# Patient Record
Sex: FEMALE | Race: WHITE
[De-identification: names, ages, dates, MRNs, and addresses within clinical notes are randomized per-mention and may not be internally consistent; named-entity substitution may affect disease eponyms.]

---

## 2019-10-28 ENCOUNTER — HOSPITAL ENCOUNTER (OUTPATIENT)
Age: 69
End: 2019-10-28
Payer: MEDICARE

## 2019-10-28 DIAGNOSIS — Z12.4: Primary | ICD-10-CM

## 2019-10-28 PROCEDURE — 88175 CYTOPATH C/V AUTO FLUID REDO: CPT

## 2019-10-28 PROCEDURE — G0145 SCR C/V CYTO,THINLAYER,RESCR: HCPCS

## 2023-11-20 ENCOUNTER — CLINICAL SUPPORT (OUTPATIENT)
Dept: PRIMARY CARE | Facility: CLINIC | Age: 73
End: 2023-11-20
Payer: MEDICARE

## 2023-11-20 DIAGNOSIS — Z23 NEED FOR IMMUNIZATION AGAINST INFLUENZA: ICD-10-CM

## 2023-11-20 PROCEDURE — 90662 IIV NO PRSV INCREASED AG IM: CPT | Performed by: FAMILY MEDICINE

## 2023-11-20 PROCEDURE — G0008 ADMIN INFLUENZA VIRUS VAC: HCPCS | Performed by: FAMILY MEDICINE

## 2023-11-21 ENCOUNTER — APPOINTMENT (OUTPATIENT)
Dept: PRIMARY CARE | Facility: CLINIC | Age: 73
End: 2023-11-21
Payer: MEDICARE

## 2023-11-21 DIAGNOSIS — Z00.00 HEALTHCARE MAINTENANCE: ICD-10-CM

## 2023-11-21 RX ORDER — HYDROCHLOROTHIAZIDE 25 MG/1
1 TABLET ORAL DAILY
COMMUNITY
Start: 2019-09-08 | End: 2023-11-21 | Stop reason: SDUPTHER

## 2023-11-21 RX ORDER — HYDROCHLOROTHIAZIDE 25 MG/1
25 TABLET ORAL DAILY
Qty: 30 TABLET | Refills: 1 | Status: SHIPPED | OUTPATIENT
Start: 2023-11-21 | End: 2023-11-28 | Stop reason: SDUPTHER

## 2023-11-28 DIAGNOSIS — Z00.00 HEALTHCARE MAINTENANCE: ICD-10-CM

## 2023-11-28 RX ORDER — HYDROCHLOROTHIAZIDE 25 MG/1
25 TABLET ORAL DAILY
Qty: 90 TABLET | Refills: 0 | Status: SHIPPED | OUTPATIENT
Start: 2023-11-28 | End: 2024-01-09 | Stop reason: SDUPTHER

## 2024-01-09 ENCOUNTER — OFFICE VISIT (OUTPATIENT)
Dept: PRIMARY CARE | Facility: CLINIC | Age: 74
End: 2024-01-09
Payer: MEDICARE

## 2024-01-09 VITALS
SYSTOLIC BLOOD PRESSURE: 142 MMHG | BODY MASS INDEX: 48.21 KG/M2 | DIASTOLIC BLOOD PRESSURE: 78 MMHG | HEART RATE: 64 BPM | HEIGHT: 62 IN | WEIGHT: 262 LBS

## 2024-01-09 DIAGNOSIS — R53.83 OTHER FATIGUE: ICD-10-CM

## 2024-01-09 DIAGNOSIS — R40.0 DAYTIME SOMNOLENCE: ICD-10-CM

## 2024-01-09 DIAGNOSIS — R73.09 ELEVATED GLUCOSE: ICD-10-CM

## 2024-01-09 DIAGNOSIS — Z53.20 OSTEOPOROSIS SCREENING DECLINED: ICD-10-CM

## 2024-01-09 DIAGNOSIS — Z12.31 ENCOUNTER FOR SCREENING MAMMOGRAM FOR BREAST CANCER: ICD-10-CM

## 2024-01-09 DIAGNOSIS — Z00.00 HEALTHCARE MAINTENANCE: ICD-10-CM

## 2024-01-09 DIAGNOSIS — Z23 NEED FOR PNEUMOCOCCAL VACCINATION: ICD-10-CM

## 2024-01-09 DIAGNOSIS — E66.01 MORBID (SEVERE) OBESITY DUE TO EXCESS CALORIES (MULTI): ICD-10-CM

## 2024-01-09 DIAGNOSIS — Z00.00 ROUTINE GENERAL MEDICAL EXAMINATION AT HEALTH CARE FACILITY: Primary | ICD-10-CM

## 2024-01-09 PROBLEM — Z96.659 HISTORY OF KNEE REPLACEMENT: Status: ACTIVE | Noted: 2024-01-09

## 2024-01-09 PROBLEM — E78.5 HYPERLIPIDEMIA: Status: ACTIVE | Noted: 2024-01-09

## 2024-01-09 PROBLEM — J30.2 SEASONAL ALLERGIES: Status: ACTIVE | Noted: 2024-01-09

## 2024-01-09 PROBLEM — I10 HYPERTENSION: Status: ACTIVE | Noted: 2024-01-09

## 2024-01-09 PROBLEM — K21.9 GERD (GASTROESOPHAGEAL REFLUX DISEASE): Status: ACTIVE | Noted: 2024-01-09

## 2024-01-09 PROCEDURE — 1160F RVW MEDS BY RX/DR IN RCRD: CPT | Performed by: FAMILY MEDICINE

## 2024-01-09 PROCEDURE — 1124F ACP DISCUSS-NO DSCNMKR DOCD: CPT | Performed by: FAMILY MEDICINE

## 2024-01-09 PROCEDURE — 99214 OFFICE O/P EST MOD 30 MIN: CPT | Performed by: FAMILY MEDICINE

## 2024-01-09 PROCEDURE — G0439 PPPS, SUBSEQ VISIT: HCPCS | Performed by: FAMILY MEDICINE

## 2024-01-09 PROCEDURE — G0009 ADMIN PNEUMOCOCCAL VACCINE: HCPCS | Performed by: FAMILY MEDICINE

## 2024-01-09 PROCEDURE — 90677 PCV20 VACCINE IM: CPT | Performed by: FAMILY MEDICINE

## 2024-01-09 PROCEDURE — 3008F BODY MASS INDEX DOCD: CPT | Performed by: FAMILY MEDICINE

## 2024-01-09 PROCEDURE — 1170F FXNL STATUS ASSESSED: CPT | Performed by: FAMILY MEDICINE

## 2024-01-09 PROCEDURE — 1159F MED LIST DOCD IN RCRD: CPT | Performed by: FAMILY MEDICINE

## 2024-01-09 PROCEDURE — 3077F SYST BP >= 140 MM HG: CPT | Performed by: FAMILY MEDICINE

## 2024-01-09 PROCEDURE — 1036F TOBACCO NON-USER: CPT | Performed by: FAMILY MEDICINE

## 2024-01-09 PROCEDURE — 3078F DIAST BP <80 MM HG: CPT | Performed by: FAMILY MEDICINE

## 2024-01-09 RX ORDER — IBUPROFEN 200 MG
TABLET ORAL
COMMUNITY

## 2024-01-09 RX ORDER — HYDROCHLOROTHIAZIDE 25 MG/1
25 TABLET ORAL DAILY
Qty: 90 TABLET | Refills: 3 | Status: SHIPPED | OUTPATIENT
Start: 2024-01-09

## 2024-01-09 RX ORDER — ROSUVASTATIN CALCIUM 5 MG/1
5 TABLET, COATED ORAL DAILY
Qty: 90 TABLET | Refills: 3 | Status: SHIPPED | OUTPATIENT
Start: 2024-01-09

## 2024-01-09 RX ORDER — LEVALBUTEROL TARTRATE 45 UG/1
1 AEROSOL, METERED ORAL EVERY 4 HOURS PRN
COMMUNITY
End: 2024-01-09 | Stop reason: SDUPTHER

## 2024-01-09 RX ORDER — LEVALBUTEROL TARTRATE 45 UG/1
1-2 AEROSOL, METERED ORAL EVERY 4 HOURS PRN
Qty: 15 G | Refills: 11 | Status: SHIPPED | OUTPATIENT
Start: 2024-01-09

## 2024-01-09 RX ORDER — MULTIVITAMIN
TABLET ORAL
COMMUNITY

## 2024-01-09 RX ORDER — OMEPRAZOLE 40 MG/1
40 CAPSULE, DELAYED RELEASE ORAL DAILY
Qty: 90 CAPSULE | Refills: 3 | Status: SHIPPED | OUTPATIENT
Start: 2024-01-09

## 2024-01-09 RX ORDER — CETIRIZINE HYDROCHLORIDE 10 MG/1
TABLET ORAL
COMMUNITY

## 2024-01-09 RX ORDER — PNV NO.95/FERROUS FUM/FOLIC AC 28MG-0.8MG
1 TABLET ORAL DAILY
COMMUNITY

## 2024-01-09 RX ORDER — OMEPRAZOLE 40 MG/1
1 CAPSULE, DELAYED RELEASE ORAL DAILY
COMMUNITY
End: 2024-01-09 | Stop reason: SDUPTHER

## 2024-01-09 RX ORDER — ROSUVASTATIN CALCIUM 5 MG/1
1 TABLET, COATED ORAL DAILY
COMMUNITY
Start: 2019-09-27 | End: 2024-01-09 | Stop reason: SDUPTHER

## 2024-01-09 RX ORDER — MULTIVITAMIN
1 TABLET ORAL
COMMUNITY
Start: 2005-11-21

## 2024-01-09 ASSESSMENT — PATIENT HEALTH QUESTIONNAIRE - PHQ9
SUM OF ALL RESPONSES TO PHQ9 QUESTIONS 1 AND 2: 0
1. LITTLE INTEREST OR PLEASURE IN DOING THINGS: NOT AT ALL
2. FEELING DOWN, DEPRESSED OR HOPELESS: NOT AT ALL

## 2024-01-09 ASSESSMENT — ACTIVITIES OF DAILY LIVING (ADL)
DRESSING: INDEPENDENT
TAKING_MEDICATION: INDEPENDENT
GROCERY_SHOPPING: INDEPENDENT
BATHING: INDEPENDENT
MANAGING_FINANCES: INDEPENDENT
DOING_HOUSEWORK: INDEPENDENT

## 2024-01-09 NOTE — PROGRESS NOTES
PRIOR AUTH DONE. ORDER EMAILED TO [facility] AT [facility] TO SCHEDULE WITH PATIENT.

## 2024-01-09 NOTE — PATIENT INSTRUCTIONS
Follow up 1 mos, labs and sleep study prior, call concerns.  Add flonase to zyrtec to help with drainage and cough.

## 2024-01-09 NOTE — PROGRESS NOTES
"Subjective   Reason for Visit: Sheila Welsh is an 73 y.o. female here for a Medicare Wellness visit.     Past Medical, Surgical, and Family History reviewed and updated in chart.    Reviewed all medications by prescribing practitioner or clinical pharmacist (such as prescriptions, OTCs, herbal therapies and supplements) and documented in the medical record.  Had Covid this September.   Has had a cough for awhile.  Started using her rescue inhaler and it helped.  Has history of allergies, and prone to wheezing.  Has to clear throat frequently.  ON zyrtec, recommend add flonase. Close follow up  Has been very tired.  Gained weight.  Plans to go back to [employer].   Pain in feet.  Worse at end of day.    GERD on meds  HTN, borderline reading, will monitor  RAD, stable currently  HPI    Patient Care Team:  Melanie Marti MD as PCP - General (Family Medicine)  Melanie Marti MD as PCP - [employer]age PCP     Review of Systems   All other systems reviewed and are negative.      Objective   Vitals:  /78 (BP Location: Left arm, Patient Position: Sitting)   Pulse 64   Ht 1.575 m (5' 2\")   Wt 119 kg (262 lb)   BMI 47.92 kg/m²       Physical Exam  Vitals and nursing note reviewed.   Constitutional:       General: She is not in acute distress.     Appearance: She is obese. She is not toxic-appearing.   HENT:      Head: Normocephalic and atraumatic.   Cardiovascular:      Rate and Rhythm: Normal rate and regular rhythm.      Heart sounds: No murmur heard.  Pulmonary:      Effort: Pulmonary effort is normal.      Breath sounds: Normal breath sounds.   Musculoskeletal:      Cervical back: Neck supple. No rigidity.      Comments:     Skin:     General: Skin is warm and dry.   Neurological:      General: No focal deficit present.      Mental Status: She is alert and oriented to person, place, and time.   Psychiatric:         Mood and Affect: Mood normal.         Behavior: Behavior normal. "         Assessment/Plan   Problem List Items Addressed This Visit       Morbid (severe) obesity due to excess calories (CMS/HCC)    Relevant Orders    Lipid Panel     Other Visit Diagnoses       Routine general medical examination at health care facility    -  Primary    Body mass index (BMI) 45.0-49.9, adult (CMS/HCC)        Relevant Orders    Lipid Panel    Hemoglobin A1C    Other fatigue        Relevant Orders    CBC and Auto Differential    Comprehensive Metabolic Panel    Lipid Panel    TSH with reflex to Free T4 if abnormal    Vitamin B12    Elevated glucose        Relevant Orders    Hemoglobin A1C    Daytime somnolence        Relevant Orders    In-Center Sleep Study    Need for pneumococcal vaccination        Relevant Orders    Pneumococcal conjugate vaccine, 20-valent (PREVNAR 20) (Completed)    Encounter for screening mammogram for breast cancer        Relevant Orders    BI mammo bilateral screening tomosynthesis    Osteoporosis screening declined

## 2024-01-27 ENCOUNTER — TELEPHONE (OUTPATIENT)
Dept: SLEEP MEDICINE | Facility: CLINIC | Age: 74
End: 2024-01-27
Payer: MEDICARE

## 2024-01-31 ENCOUNTER — CLINICAL SUPPORT (OUTPATIENT)
Dept: SLEEP MEDICINE | Facility: CLINIC | Age: 74
End: 2024-01-31
Payer: MEDICARE

## 2024-01-31 VITALS
HEIGHT: 62 IN | SYSTOLIC BLOOD PRESSURE: 162 MMHG | DIASTOLIC BLOOD PRESSURE: 78 MMHG | BODY MASS INDEX: 48.28 KG/M2 | WEIGHT: 262.35 LBS | TEMPERATURE: 97.2 F

## 2024-01-31 DIAGNOSIS — G47.33 OBSTRUCTIVE SLEEP APNEA (ADULT) (PEDIATRIC): ICD-10-CM

## 2024-01-31 DIAGNOSIS — G47.61 PERIODIC LIMB MOVEMENT DISORDER: ICD-10-CM

## 2024-01-31 DIAGNOSIS — R40.0 DAYTIME SOMNOLENCE: ICD-10-CM

## 2024-01-31 PROCEDURE — 95810 POLYSOM 6/> YRS 4/> PARAM: CPT | Performed by: INTERNAL MEDICINE

## 2024-01-31 ASSESSMENT — SLEEP AND FATIGUE QUESTIONNAIRES
ESS-CHAD TOTAL SCORE: 3
HOW LIKELY ARE YOU TO NOD OFF OR FALL ASLEEP IN A CAR, WHILE STOPPED FOR A FEW MINUTES IN TRAFFIC: WOULD NEVER DOZE
HOW LIKELY ARE YOU TO NOD OFF OR FALL ASLEEP WHILE WATCHING TV: SLIGHT CHANCE OF DOZING
HOW LIKELY ARE YOU TO NOD OFF OR FALL ASLEEP WHILE SITTING AND TALKING TO SOMEONE: WOULD NEVER DOZE
HOW LIKELY ARE YOU TO NOD OFF OR FALL ASLEEP WHILE SITTING AND READING: SLIGHT CHANCE OF DOZING
HOW LIKELY ARE YOU TO NOD OFF OR FALL ASLEEP WHILE SITTING QUIETLY AFTER LUNCH WITHOUT ALCOHOL: WOULD NEVER DOZE
HOW LIKELY ARE YOU TO NOD OFF OR FALL ASLEEP WHEN YOU ARE A PASSENGER IN A CAR FOR AN HOUR WITHOUT A BREAK: WOULD NEVER DOZE
HOW LIKELY ARE YOU TO NOD OFF OR FALL ASLEEP WHILE LYING DOWN TO REST IN THE AFTERNOON WHEN CIRCUMSTANCES PERMIT: SLIGHT CHANCE OF DOZING
SITING INACTIVE IN A PUBLIC PLACE LIKE A CLASS ROOM OR A MOVIE THEATER: WOULD NEVER DOZE

## 2024-02-01 NOTE — PROGRESS NOTES
[facility] TECH NOTE:     Patient: Sheila Welsh   MRN//AGE: [id number]  1950  73 y.o.   Technologist: SAHRA Barcenas   Room: 4   Service Date: 2024        Sleep Testing Location: [place] [address]     [place]: 3    TECHNOLOGIST SLEEP STUDY PROCEDURE NOTE:   This sleep study is being conducted according to the policies and procedures outlined by the AAS accreditation standards.  The sleep study procedure and processes involved during this appointment was explained to the patient and all questions were answered. The patient verbalized understanding.      The patient is a 73 y.o. year old female scheduled for a Diagnostic PSG Split night with montage of:  PSG MASTER/  PAP MASTER       The study that was ultimately completed was a Polysomnogram  with montage of:  PSG MASTER  .    The full study Was completed.  Patient questionnaires completed?: yes     Consents signed? yes    Initial Fall Risk Screening:     Sheila has not fallen in the last 6 months. Sheila does not have a fear of falling. He does not need assistance with sitting, standing, or walking. she does not need assistance walking in her home. she does not need assistance in an unfamiliar setting. The patient is notusing an assistive device.     Brief Study observations: Mrs. Welsh arrived as scheduled for her SPLIT study. She was escorted to room 4. All questions were answered and consent was signed. The criteria for a split night study was not met due to poor sleep efficiency/frequent, prolonged awakenings. During sleep periods, she experienced intermittent moderate snoring, periodic limb movements and obstructive respiratory events. All stages of sleep were observed.     Deviation to order/protocol and reason: The criteria for split study was not met in time to complete a titration.        After the procedure, the patient was informed to ensure followup with ordering clinician for testing results.      Technologist: Zulma Tatum  RPSGT

## 2024-02-02 ENCOUNTER — LAB (OUTPATIENT)
Dept: LAB | Facility: LAB | Age: 74
End: 2024-02-02
Payer: MEDICARE

## 2024-02-02 DIAGNOSIS — R53.83 OTHER FATIGUE: ICD-10-CM

## 2024-02-02 DIAGNOSIS — R73.09 ELEVATED GLUCOSE: ICD-10-CM

## 2024-02-02 DIAGNOSIS — E66.01 MORBID (SEVERE) OBESITY DUE TO EXCESS CALORIES (MULTI): ICD-10-CM

## 2024-02-02 LAB
ALBUMIN SERPL BCP-MCNC: 4 G/DL (ref 3.4–5)
ALP SERPL-CCNC: 56 U/L (ref 33–136)
ALT SERPL W P-5'-P-CCNC: 14 U/L (ref 7–45)
ANION GAP SERPL CALC-SCNC: 8 MMOL/L (ref 10–20)
AST SERPL W P-5'-P-CCNC: 15 U/L (ref 9–39)
BASOPHILS # BLD AUTO: 0.04 X10*3/UL (ref 0–0.1)
BASOPHILS NFR BLD AUTO: 0.8 %
BILIRUB SERPL-MCNC: 0.5 MG/DL (ref 0–1.2)
BUN SERPL-MCNC: 27 MG/DL (ref 6–23)
CALCIUM SERPL-MCNC: 9.7 MG/DL (ref 8.6–10.3)
CHLORIDE SERPL-SCNC: 104 MMOL/L (ref 98–107)
CHOLEST SERPL-MCNC: 128 MG/DL (ref 0–199)
CHOLESTEROL/HDL RATIO: 2.4
CO2 SERPL-SCNC: 31 MMOL/L (ref 21–32)
CREAT SERPL-MCNC: 0.78 MG/DL (ref 0.5–1.05)
EGFRCR SERPLBLD CKD-EPI 2021: 81 ML/MIN/1.73M*2
EOSINOPHIL # BLD AUTO: 0.31 X10*3/UL (ref 0–0.4)
EOSINOPHIL NFR BLD AUTO: 5.9 %
ERYTHROCYTE [DISTWIDTH] IN BLOOD BY AUTOMATED COUNT: 14.4 % (ref 11.5–14.5)
EST. AVERAGE GLUCOSE BLD GHB EST-MCNC: 117 MG/DL
GLUCOSE SERPL-MCNC: 93 MG/DL (ref 74–99)
HBA1C MFR BLD: 5.7 %
HCT VFR BLD AUTO: 42.1 % (ref 36–46)
HDLC SERPL-MCNC: 54 MG/DL
HGB BLD-MCNC: 13.1 G/DL (ref 12–16)
IMM GRANULOCYTES # BLD AUTO: 0 X10*3/UL (ref 0–0.5)
IMM GRANULOCYTES NFR BLD AUTO: 0 % (ref 0–0.9)
LDLC SERPL CALC-MCNC: 61 MG/DL
LYMPHOCYTES # BLD AUTO: 1.35 X10*3/UL (ref 0.8–3)
LYMPHOCYTES NFR BLD AUTO: 25.8 %
MCH RBC QN AUTO: 29 PG (ref 26–34)
MCHC RBC AUTO-ENTMCNC: 31.1 G/DL (ref 32–36)
MCV RBC AUTO: 93 FL (ref 80–100)
MONOCYTES # BLD AUTO: 0.4 X10*3/UL (ref 0.05–0.8)
MONOCYTES NFR BLD AUTO: 7.6 %
NEUTROPHILS # BLD AUTO: 3.13 X10*3/UL (ref 1.6–5.5)
NEUTROPHILS NFR BLD AUTO: 59.9 %
NON HDL CHOLESTEROL: 74 MG/DL (ref 0–149)
NRBC BLD-RTO: 0 /100 WBCS (ref 0–0)
PLATELET # BLD AUTO: 269 X10*3/UL (ref 150–450)
POTASSIUM SERPL-SCNC: 4.1 MMOL/L (ref 3.5–5.3)
PROT SERPL-MCNC: 6.5 G/DL (ref 6.4–8.2)
RBC # BLD AUTO: 4.52 X10*6/UL (ref 4–5.2)
SODIUM SERPL-SCNC: 139 MMOL/L (ref 136–145)
TRIGL SERPL-MCNC: 65 MG/DL (ref 0–149)
TSH SERPL-ACNC: 1.35 MIU/L (ref 0.44–3.98)
VIT B12 SERPL-MCNC: 346 PG/ML (ref 211–911)
VLDL: 13 MG/DL (ref 0–40)
WBC # BLD AUTO: 5.2 X10*3/UL (ref 4.4–11.3)

## 2024-02-02 PROCEDURE — 80061 LIPID PANEL: CPT

## 2024-02-02 PROCEDURE — 83036 HEMOGLOBIN GLYCOSYLATED A1C: CPT

## 2024-02-02 PROCEDURE — 85025 COMPLETE CBC W/AUTO DIFF WBC: CPT

## 2024-02-02 PROCEDURE — 80053 COMPREHEN METABOLIC PANEL: CPT

## 2024-02-02 PROCEDURE — 82607 VITAMIN B-12: CPT

## 2024-02-02 PROCEDURE — 36415 COLL VENOUS BLD VENIPUNCTURE: CPT

## 2024-02-02 PROCEDURE — 84443 ASSAY THYROID STIM HORMONE: CPT

## 2024-02-05 ENCOUNTER — HOSPITAL ENCOUNTER (OUTPATIENT)
Dept: RADIOLOGY | Facility: CLINIC | Age: 74
Discharge: HOME | End: 2024-02-05
Payer: MEDICARE

## 2024-02-05 DIAGNOSIS — Z12.31 ENCOUNTER FOR SCREENING MAMMOGRAM FOR BREAST CANCER: ICD-10-CM

## 2024-02-05 PROCEDURE — 77063 BREAST TOMOSYNTHESIS BI: CPT | Performed by: RADIOLOGY

## 2024-02-05 PROCEDURE — 77067 SCR MAMMO BI INCL CAD: CPT

## 2024-02-05 PROCEDURE — 77067 SCR MAMMO BI INCL CAD: CPT | Performed by: RADIOLOGY

## 2024-02-08 ENCOUNTER — OFFICE VISIT (OUTPATIENT)
Dept: PRIMARY CARE | Facility: CLINIC | Age: 74
End: 2024-02-08
Payer: MEDICARE

## 2024-02-08 VITALS
WEIGHT: 260 LBS | HEART RATE: 68 BPM | SYSTOLIC BLOOD PRESSURE: 134 MMHG | DIASTOLIC BLOOD PRESSURE: 78 MMHG | BODY MASS INDEX: 47.84 KG/M2 | HEIGHT: 62 IN

## 2024-02-08 DIAGNOSIS — R53.83 OTHER FATIGUE: ICD-10-CM

## 2024-02-08 DIAGNOSIS — R73.03 PREDIABETES: Primary | ICD-10-CM

## 2024-02-08 PROCEDURE — 3008F BODY MASS INDEX DOCD: CPT | Performed by: FAMILY MEDICINE

## 2024-02-08 PROCEDURE — 3075F SYST BP GE 130 - 139MM HG: CPT | Performed by: FAMILY MEDICINE

## 2024-02-08 PROCEDURE — 99213 OFFICE O/P EST LOW 20 MIN: CPT | Performed by: FAMILY MEDICINE

## 2024-02-08 PROCEDURE — 1160F RVW MEDS BY RX/DR IN RCRD: CPT | Performed by: FAMILY MEDICINE

## 2024-02-08 PROCEDURE — 3078F DIAST BP <80 MM HG: CPT | Performed by: FAMILY MEDICINE

## 2024-02-08 PROCEDURE — 1036F TOBACCO NON-USER: CPT | Performed by: FAMILY MEDICINE

## 2024-02-08 PROCEDURE — 1159F MED LIST DOCD IN RCRD: CPT | Performed by: FAMILY MEDICINE

## 2024-02-08 NOTE — PROGRESS NOTES
Subjective  Sheila Welsh is a 73 y.o. female who presents for Follow-up.  HPI  Fup testing  A1c 5.7, discussed prediabetes and natural progression to diabetes if no changes, discussed healthy diet, no snacking after dinner  Sleep study results pending    Review of Systems   All other systems reviewed and are negative.  .    Objective     Visit Vitals  /78 (BP Location: Left arm, Patient Position: Sitting)   Pulse 68      Physical Exam  Vitals and nursing note reviewed.   Constitutional:       General: She is not in acute distress.     Appearance: Normal appearance. She is not toxic-appearing.   HENT:      Head: Normocephalic and atraumatic.   Cardiovascular:      Rate and Rhythm: Normal rate and regular rhythm.      Heart sounds: No murmur heard.  Pulmonary:      Effort: Pulmonary effort is normal.      Breath sounds: Normal breath sounds.   Musculoskeletal:      Cervical back: Neck supple. No rigidity.      Comments:     Skin:     General: Skin is warm and dry.   Neurological:      General: No focal deficit present.      Mental Status: She is alert and oriented to person, place, and time.   Psychiatric:         Mood and Affect: Mood normal.         Behavior: Behavior normal.         Assessment/Plan   Problem List Items Addressed This Visit       Prediabetes - Primary    Relevant Orders    Comprehensive Metabolic Panel    Hemoglobin A1C     Other Visit Diagnoses       Other fatigue                       Melanie Marti MD

## 2024-02-13 PROBLEM — G47.30 SEVERE SLEEP APNEA: Status: ACTIVE | Noted: 2024-02-13

## 2024-02-19 ENCOUNTER — OFFICE VISIT (OUTPATIENT)
Dept: PRIMARY CARE | Facility: CLINIC | Age: 74
End: 2024-02-19
Payer: MEDICARE

## 2024-02-19 VITALS
DIASTOLIC BLOOD PRESSURE: 74 MMHG | WEIGHT: 260 LBS | BODY MASS INDEX: 47.84 KG/M2 | HEART RATE: 68 BPM | SYSTOLIC BLOOD PRESSURE: 134 MMHG | HEIGHT: 62 IN

## 2024-02-19 DIAGNOSIS — G47.30 SEVERE SLEEP APNEA: Primary | ICD-10-CM

## 2024-02-19 PROCEDURE — 3078F DIAST BP <80 MM HG: CPT | Performed by: FAMILY MEDICINE

## 2024-02-19 PROCEDURE — 99213 OFFICE O/P EST LOW 20 MIN: CPT | Performed by: FAMILY MEDICINE

## 2024-02-19 PROCEDURE — 3008F BODY MASS INDEX DOCD: CPT | Performed by: FAMILY MEDICINE

## 2024-02-19 PROCEDURE — 1159F MED LIST DOCD IN RCRD: CPT | Performed by: FAMILY MEDICINE

## 2024-02-19 PROCEDURE — 1160F RVW MEDS BY RX/DR IN RCRD: CPT | Performed by: FAMILY MEDICINE

## 2024-02-19 PROCEDURE — 3075F SYST BP GE 130 - 139MM HG: CPT | Performed by: FAMILY MEDICINE

## 2024-02-19 PROCEDURE — 1036F TOBACCO NON-USER: CPT | Performed by: FAMILY MEDICINE

## 2024-02-19 NOTE — PROGRESS NOTES
Subjective  Sheila Welsh is a 73 y.o. female who presents for Follow-up.  HPI  Here for follow up sleep study due to fatigue.  Has severe sleep apnea, with o2 judith 78.  Recommend working toward ideal body weight.  Recommend cpap auto-titration. Discussed risks of untreated sleep apnea, and benefits of treatment.      Review of Systems   All other systems reviewed and are negative.  .    Objective     Visit Vitals  /74 (BP Location: Right arm, Patient Position: Sitting)   Pulse 68      Physical Exam  Vitals reviewed.   HENT:      Head: Normocephalic.   Pulmonary:      Effort: Pulmonary effort is normal.   Neurological:      General: No focal deficit present.      Mental Status: She is alert.   Psychiatric:         Mood and Affect: Mood normal.         Assessment/Plan   Problem List Items Addressed This Visit       Severe sleep apnea - Primary              Melanie Marti MD

## 2024-03-25 ENCOUNTER — APPOINTMENT (OUTPATIENT)
Dept: PRIMARY CARE | Facility: CLINIC | Age: 74
End: 2024-03-25
Payer: MEDICARE

## 2024-04-23 ENCOUNTER — OFFICE VISIT (OUTPATIENT)
Dept: PRIMARY CARE | Facility: CLINIC | Age: 74
End: 2024-04-23
Payer: MEDICARE

## 2024-04-23 VITALS
HEART RATE: 80 BPM | HEIGHT: 62 IN | DIASTOLIC BLOOD PRESSURE: 100 MMHG | WEIGHT: 262 LBS | SYSTOLIC BLOOD PRESSURE: 154 MMHG | BODY MASS INDEX: 48.21 KG/M2

## 2024-04-23 DIAGNOSIS — R03.0 ELEVATED BP WITHOUT DIAGNOSIS OF HYPERTENSION: ICD-10-CM

## 2024-04-23 DIAGNOSIS — Z63.6 CAREGIVER STRESS: ICD-10-CM

## 2024-04-23 DIAGNOSIS — G47.30 SEVERE SLEEP APNEA: Primary | ICD-10-CM

## 2024-04-23 PROCEDURE — 1159F MED LIST DOCD IN RCRD: CPT | Performed by: FAMILY MEDICINE

## 2024-04-23 PROCEDURE — 3077F SYST BP >= 140 MM HG: CPT | Performed by: FAMILY MEDICINE

## 2024-04-23 PROCEDURE — 1160F RVW MEDS BY RX/DR IN RCRD: CPT | Performed by: FAMILY MEDICINE

## 2024-04-23 PROCEDURE — 3008F BODY MASS INDEX DOCD: CPT | Performed by: FAMILY MEDICINE

## 2024-04-23 PROCEDURE — 1036F TOBACCO NON-USER: CPT | Performed by: FAMILY MEDICINE

## 2024-04-23 PROCEDURE — 3080F DIAST BP >= 90 MM HG: CPT | Performed by: FAMILY MEDICINE

## 2024-04-23 PROCEDURE — 99214 OFFICE O/P EST MOD 30 MIN: CPT | Performed by: FAMILY MEDICINE

## 2024-04-23 SDOH — SOCIAL STABILITY - SOCIAL INSECURITY: DEPENDENT RELATIVE NEEDING CARE AT HOME: Z63.6

## 2024-04-23 NOTE — PROGRESS NOTES
Subjective  Sheila Welsh is a 73 y.o. female who presents for Follow-up.  HPI  Here for follow up starting cpap for sleep apnea.  States using every night, 4 to 6 hours and she does feel more rested when she wakes up in the afternoon . Still feels like she needs an afternoon nap, but overall going okay.    Bmi 47, reviewed benefits of working toward ideal body weight particularly in relation to sleep apnea improvement/resolutoin  Elevated bp- states checking at home and getting good readings.  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    Review of Systems   All other systems reviewed and are negative.  .    Objective     Visit Vitals  BP (!) 154/100 (BP Location: Left arm, Patient Position: Sitting)   Pulse 80      Physical Exam  Vitals reviewed.   HENT:      Head: Normocephalic.   Pulmonary:      Effort: Pulmonary effort is normal.   Skin:     Coloration: Skin is not pale.   Neurological:      General: No focal deficit present.      Mental Status: She is alert.   Psychiatric:         Mood and Affect: Mood normal.         Assessment/Plan   Problem List Items Addressed This Visit       Severe sleep apnea - Primary     Other Visit Diagnoses       Body mass index (BMI) 45.0-49.9, adult (Multi)        Elevated BP without diagnosis of hypertension        Caregiver stress                     Monitor bp, call concerns.  Followup in August as scheduled with labs prior.   Melanie Marti MD

## 2024-08-08 ENCOUNTER — APPOINTMENT (OUTPATIENT)
Dept: PRIMARY CARE | Facility: CLINIC | Age: 74
End: 2024-08-08
Payer: MEDICARE

## 2024-08-08 ENCOUNTER — LAB (OUTPATIENT)
Facility: LAB | Age: 74
End: 2024-08-08
Payer: MEDICARE

## 2024-08-08 VITALS
BODY MASS INDEX: 48.95 KG/M2 | HEART RATE: 64 BPM | WEIGHT: 266 LBS | HEIGHT: 62 IN | SYSTOLIC BLOOD PRESSURE: 138 MMHG | DIASTOLIC BLOOD PRESSURE: 80 MMHG

## 2024-08-08 DIAGNOSIS — R73.03 PREDIABETES: ICD-10-CM

## 2024-08-08 DIAGNOSIS — G47.30 SEVERE SLEEP APNEA: Primary | ICD-10-CM

## 2024-08-08 DIAGNOSIS — I10 PRIMARY HYPERTENSION: ICD-10-CM

## 2024-08-08 DIAGNOSIS — E78.49 OTHER HYPERLIPIDEMIA: ICD-10-CM

## 2024-08-08 LAB
ALBUMIN SERPL BCP-MCNC: 4.1 G/DL (ref 3.4–5)
ALP SERPL-CCNC: 71 U/L (ref 33–136)
ALT SERPL W P-5'-P-CCNC: 12 U/L (ref 7–45)
ANION GAP SERPL CALC-SCNC: 8 MMOL/L (ref 10–20)
AST SERPL W P-5'-P-CCNC: 13 U/L (ref 9–39)
BILIRUB SERPL-MCNC: 0.4 MG/DL (ref 0–1.2)
BUN SERPL-MCNC: 16 MG/DL (ref 6–23)
CALCIUM SERPL-MCNC: 9.4 MG/DL (ref 8.6–10.3)
CHLORIDE SERPL-SCNC: 103 MMOL/L (ref 98–107)
CO2 SERPL-SCNC: 32 MMOL/L (ref 21–32)
CREAT SERPL-MCNC: 0.8 MG/DL (ref 0.5–1.05)
EGFRCR SERPLBLD CKD-EPI 2021: 78 ML/MIN/1.73M*2
GLUCOSE SERPL-MCNC: 93 MG/DL (ref 74–99)
POTASSIUM SERPL-SCNC: 4 MMOL/L (ref 3.5–5.3)
PROT SERPL-MCNC: 6.9 G/DL (ref 6.4–8.2)
SODIUM SERPL-SCNC: 139 MMOL/L (ref 136–145)

## 2024-08-08 PROCEDURE — 83036 HEMOGLOBIN GLYCOSYLATED A1C: CPT

## 2024-08-08 PROCEDURE — 80053 COMPREHEN METABOLIC PANEL: CPT

## 2024-08-08 PROCEDURE — 36415 COLL VENOUS BLD VENIPUNCTURE: CPT

## 2024-08-08 NOTE — PROGRESS NOTES
Patient presents for periodic surveillance of chronic medical problems.     Subjective  Sheila Welsh is a 74 y.o. female who presents for Annual Exam.  HPI  HTN stable on current regimen, she is getting higher readings with her home wrist cuff.  We decided to start with having her come in for a nurse bp check to compare our reading to her cuff reading  Sleep apnea, struggling with compliance, doesn't sleep well in general, states company notified her she's not going to qualify for medicare to pay if doesn't start using more, she does feel better when she uses it  Hyperlipidemia, GERD, stable  Prediabetes, due to reassess, getting labs after appt  Feet hurt, seeing podiatry.    Review of Systems   All other systems reviewed and are negative.  .    Current Outpatient Medications:     calcium carbonate-vitamin D3 600 mg-10 mcg (400 unit) tablet, Take 1 tablet by mouth., Disp: , Rfl:     cetirizine (ZyrTEC) 10 mg tablet, Take by mouth., Disp: , Rfl:     cyanocobalamin (Vitamin B-12) 100 mcg tablet, Take 1 tablet (100 mcg) by mouth once daily., Disp: , Rfl:     hydroCHLOROthiazide (HYDRODiuril) 25 mg tablet, Take 1 tablet (25 mg) by mouth once daily., Disp: 90 tablet, Rfl: 3    ibuprofen 200 mg tablet, Take by mouth., Disp: , Rfl:     levalbuterol (Xopenex) 45 mcg/actuation inhaler, Inhale 1-2 puffs every 4 hours if needed for shortness of breath., Disp: 15 g, Rfl: 11    multivitamin tablet, Take by mouth., Disp: , Rfl:     omeprazole (PriLOSEC) 40 mg DR capsule, Take 1 capsule (40 mg) by mouth once daily., Disp: 90 capsule, Rfl: 3    rosuvastatin (Crestor) 5 mg tablet, Take 1 tablet (5 mg) by mouth once daily., Disp: 90 tablet, Rfl: 3   Patient Active Problem List   Diagnosis    Seasonal allergies    Morbid (severe) obesity due to excess calories (Multi)    Lumbosacral spondylosis without myelopathy    Hypertension    Hyperlipidemia    History of knee replacement    GERD (gastroesophageal reflux disease)     Prediabetes    Severe sleep apnea      Objective     Visit Vitals  /80 (BP Location: Left arm, Patient Position: Sitting)   Pulse 64      Physical Exam  Vitals and nursing note reviewed.   Constitutional:       General: She is not in acute distress.     Appearance: Normal appearance. She is not toxic-appearing.   HENT:      Head: Normocephalic and atraumatic.   Cardiovascular:      Rate and Rhythm: Normal rate and regular rhythm.      Heart sounds: No murmur heard.  Pulmonary:      Effort: Pulmonary effort is normal.      Breath sounds: Normal breath sounds.   Abdominal:      Palpations: Abdomen is soft.   Musculoskeletal:      Cervical back: Neck supple. No rigidity.      Comments:     Skin:     General: Skin is warm and dry.   Neurological:      General: No focal deficit present.      Mental Status: She is alert and oriented to person, place, and time.   Psychiatric:         Mood and Affect: Mood normal.         Behavior: Behavior normal.         Assessment/Plan   Problem List Items Addressed This Visit       Hypertension    Hyperlipidemia    Prediabetes    Severe sleep apnea - Primary    Relevant Orders    Positive Airway Pressure (PAP) Therapy        Labs soon, followup 3 mos.        Melanie Marti MD

## 2024-08-09 LAB
EST. AVERAGE GLUCOSE BLD GHB EST-MCNC: 114 MG/DL
HBA1C MFR BLD: 5.6 %

## 2024-09-16 ENCOUNTER — TELEPHONE (OUTPATIENT)
Dept: PRIMARY CARE | Facility: CLINIC | Age: 74
End: 2024-09-16
Payer: MEDICARE

## 2024-10-31 ENCOUNTER — PHARMACY VISIT (OUTPATIENT)
Dept: PHARMACY | Facility: CLINIC | Age: 74
End: 2024-10-31
Payer: MEDICARE

## 2024-10-31 PROCEDURE — RXMED WILLOW AMBULATORY MEDICATION CHARGE

## 2024-11-07 ENCOUNTER — APPOINTMENT (OUTPATIENT)
Age: 74
End: 2024-11-07
Payer: MEDICARE

## 2024-11-18 DIAGNOSIS — Z00.00 HEALTHCARE MAINTENANCE: ICD-10-CM

## 2024-11-18 RX ORDER — LEVALBUTEROL TARTRATE 45 UG/1
1-2 AEROSOL, METERED ORAL EVERY 4 HOURS PRN
Qty: 15 G | Refills: 0 | Status: SHIPPED | OUTPATIENT
Start: 2024-11-18

## 2025-03-13 ENCOUNTER — APPOINTMENT (OUTPATIENT)
Dept: RADIOLOGY | Facility: HOSPITAL | Age: 75
End: 2025-03-13
Payer: MEDICARE

## 2025-03-13 ENCOUNTER — HOSPITAL ENCOUNTER (EMERGENCY)
Facility: HOSPITAL | Age: 75
Discharge: HOME | End: 2025-03-13
Attending: EMERGENCY MEDICINE
Payer: MEDICARE

## 2025-03-13 ENCOUNTER — APPOINTMENT (OUTPATIENT)
Dept: CARDIOLOGY | Facility: HOSPITAL | Age: 75
End: 2025-03-13
Payer: MEDICARE

## 2025-03-13 VITALS
BODY MASS INDEX: 48.1 KG/M2 | OXYGEN SATURATION: 96 % | DIASTOLIC BLOOD PRESSURE: 108 MMHG | SYSTOLIC BLOOD PRESSURE: 151 MMHG | TEMPERATURE: 98.1 F | RESPIRATION RATE: 18 BRPM | HEART RATE: 70 BPM | WEIGHT: 263 LBS

## 2025-03-13 DIAGNOSIS — I48.91 NEW ONSET ATRIAL FIBRILLATION (MULTI): Primary | ICD-10-CM

## 2025-03-13 LAB
ALBUMIN SERPL BCP-MCNC: 3.9 G/DL (ref 3.4–5)
ALP SERPL-CCNC: 58 U/L (ref 33–136)
ALT SERPL W P-5'-P-CCNC: 12 U/L (ref 7–45)
ANION GAP SERPL CALC-SCNC: 10 MMOL/L (ref 10–20)
APPEARANCE UR: CLEAR
AST SERPL W P-5'-P-CCNC: 13 U/L (ref 9–39)
BASOPHILS # BLD AUTO: 0.04 X10*3/UL (ref 0–0.1)
BASOPHILS NFR BLD AUTO: 0.6 %
BILIRUB SERPL-MCNC: 0.4 MG/DL (ref 0–1.2)
BILIRUB UR STRIP.AUTO-MCNC: NEGATIVE MG/DL
BUN SERPL-MCNC: 17 MG/DL (ref 6–23)
CALCIUM SERPL-MCNC: 9.1 MG/DL (ref 8.6–10.3)
CARDIAC TROPONIN I PNL SERPL HS: 12 NG/L (ref 0–13)
CHLORIDE SERPL-SCNC: 103 MMOL/L (ref 98–107)
CO2 SERPL-SCNC: 30 MMOL/L (ref 21–32)
COLOR UR: COLORLESS
CREAT SERPL-MCNC: 0.91 MG/DL (ref 0.5–1.05)
EGFRCR SERPLBLD CKD-EPI 2021: 67 ML/MIN/1.73M*2
EOSINOPHIL # BLD AUTO: 0.19 X10*3/UL (ref 0–0.4)
EOSINOPHIL NFR BLD AUTO: 3.1 %
ERYTHROCYTE [DISTWIDTH] IN BLOOD BY AUTOMATED COUNT: 14.8 % (ref 11.5–14.5)
GLUCOSE SERPL-MCNC: 113 MG/DL (ref 74–99)
GLUCOSE UR STRIP.AUTO-MCNC: NORMAL MG/DL
HCT VFR BLD AUTO: 43.4 % (ref 36–46)
HGB BLD-MCNC: 13.8 G/DL (ref 12–16)
HOLD SPECIMEN: NORMAL
IMM GRANULOCYTES # BLD AUTO: 0.01 X10*3/UL (ref 0–0.5)
IMM GRANULOCYTES NFR BLD AUTO: 0.2 % (ref 0–0.9)
KETONES UR STRIP.AUTO-MCNC: NEGATIVE MG/DL
LEUKOCYTE ESTERASE UR QL STRIP.AUTO: NEGATIVE
LYMPHOCYTES # BLD AUTO: 1.56 X10*3/UL (ref 0.8–3)
LYMPHOCYTES NFR BLD AUTO: 25.3 %
MAGNESIUM SERPL-MCNC: 2.21 MG/DL (ref 1.6–2.4)
MCH RBC QN AUTO: 28.9 PG (ref 26–34)
MCHC RBC AUTO-ENTMCNC: 31.8 G/DL (ref 32–36)
MCV RBC AUTO: 91 FL (ref 80–100)
MONOCYTES # BLD AUTO: 0.53 X10*3/UL (ref 0.05–0.8)
MONOCYTES NFR BLD AUTO: 8.6 %
NEUTROPHILS # BLD AUTO: 3.83 X10*3/UL (ref 1.6–5.5)
NEUTROPHILS NFR BLD AUTO: 62.2 %
NITRITE UR QL STRIP.AUTO: NEGATIVE
NRBC BLD-RTO: 0 /100 WBCS (ref 0–0)
PH UR STRIP.AUTO: 7 [PH]
PLATELET # BLD AUTO: 281 X10*3/UL (ref 150–450)
POTASSIUM SERPL-SCNC: 3.8 MMOL/L (ref 3.5–5.3)
PROT SERPL-MCNC: 6.8 G/DL (ref 6.4–8.2)
PROT UR STRIP.AUTO-MCNC: NEGATIVE MG/DL
Q ONSET: 228 MS
QRS COUNT: 17 BEATS
QRS DURATION: 76 MS
QT INTERVAL: 328 MS
QTC CALCULATION(BAZETT): 429 MS
QTC FREDERICIA: 393 MS
R AXIS: 65 DEGREES
RBC # BLD AUTO: 4.77 X10*6/UL (ref 4–5.2)
RBC # UR STRIP.AUTO: NEGATIVE MG/DL
SODIUM SERPL-SCNC: 139 MMOL/L (ref 136–145)
SP GR UR STRIP.AUTO: 1.01
T AXIS: -23 DEGREES
T OFFSET: 392 MS
UROBILINOGEN UR STRIP.AUTO-MCNC: NORMAL MG/DL
VENTRICULAR RATE: 103 BPM
WBC # BLD AUTO: 6.2 X10*3/UL (ref 4.4–11.3)

## 2025-03-13 PROCEDURE — 99285 EMERGENCY DEPT VISIT HI MDM: CPT | Mod: 25 | Performed by: EMERGENCY MEDICINE

## 2025-03-13 PROCEDURE — 81003 URINALYSIS AUTO W/O SCOPE: CPT | Performed by: EMERGENCY MEDICINE

## 2025-03-13 PROCEDURE — 36415 COLL VENOUS BLD VENIPUNCTURE: CPT | Performed by: EMERGENCY MEDICINE

## 2025-03-13 PROCEDURE — 80053 COMPREHEN METABOLIC PANEL: CPT | Performed by: EMERGENCY MEDICINE

## 2025-03-13 PROCEDURE — 84484 ASSAY OF TROPONIN QUANT: CPT | Performed by: EMERGENCY MEDICINE

## 2025-03-13 PROCEDURE — 85025 COMPLETE CBC W/AUTO DIFF WBC: CPT | Performed by: EMERGENCY MEDICINE

## 2025-03-13 PROCEDURE — 93005 ELECTROCARDIOGRAM TRACING: CPT

## 2025-03-13 PROCEDURE — 71045 X-RAY EXAM CHEST 1 VIEW: CPT

## 2025-03-13 PROCEDURE — 83735 ASSAY OF MAGNESIUM: CPT | Performed by: EMERGENCY MEDICINE

## 2025-03-13 PROCEDURE — 71045 X-RAY EXAM CHEST 1 VIEW: CPT | Performed by: RADIOLOGY

## 2025-03-13 RX ORDER — METOPROLOL TARTRATE 25 MG/1
25 TABLET, FILM COATED ORAL 2 TIMES DAILY
Qty: 60 TABLET | Refills: 0 | Status: SHIPPED | OUTPATIENT
Start: 2025-03-13 | End: 2026-03-13

## 2025-03-13 ASSESSMENT — COLUMBIA-SUICIDE SEVERITY RATING SCALE - C-SSRS
6. HAVE YOU EVER DONE ANYTHING, STARTED TO DO ANYTHING, OR PREPARED TO DO ANYTHING TO END YOUR LIFE?: NO
2. HAVE YOU ACTUALLY HAD ANY THOUGHTS OF KILLING YOURSELF?: NO
1. IN THE PAST MONTH, HAVE YOU WISHED YOU WERE DEAD OR WISHED YOU COULD GO TO SLEEP AND NOT WAKE UP?: NO

## 2025-03-13 ASSESSMENT — PAIN - FUNCTIONAL ASSESSMENT: PAIN_FUNCTIONAL_ASSESSMENT: 0-10

## 2025-03-13 ASSESSMENT — PAIN SCALES - GENERAL
PAINLEVEL_OUTOF10: 0 - NO PAIN
PAINLEVEL_OUTOF10: 0 - NO PAIN

## 2025-03-13 NOTE — ED PROVIDER NOTES
"HPI   Chief Complaint   Patient presents with    Palpitations     Heart racing and SOB, started this AM, Pt taken \"francois ASA\" x2 this AM. Denies CP       Limitations to History: None    HPI: 74-year-old female presents with concern for palpitations.  Began while she was getting ready this morning.  [place] like her heart was beating out of her chest.  States this has happened intermittently in the past.  Usually if she sits and relaxes it will resolve.  Denies any chest pain, shortness of breath, nausea, vomiting, abdominal pain, urinary symptoms.    Additional History Obtained from: Daughter at the bedside.    ------------------------------------------------------------------------------------------------------------------------------------------  Physical Exam:    VS: As documented in the triage note and EMR flowsheet from this visit were reviewed.    Appearance: Alert. cooperative,  in no acute distress.   Skin: Intact,  dry skin, no lesions, rash, petechiae or purpura.   Eyes: PERRLA, EOMs intact,  Conjunctiva pink with no redness or exudates.   HENT: Normocephalic, atraumatic. Nares patent. No intraoral lesions.   Neck: Supple, without meningismus. Trachea at midline. No lymphadenopathy.  Pulmonary: Clear bilaterally with good chest wall excursion. No rales, rhonchi or wheezing. No accessory muscle use or stridor.  Cardiac: Tachycardic and irregular rhythm, no rubs, murmurs, or gallops.   Abdomen: Abdomen is soft, nontender, and nondistended.  No palpable organomegaly.  No rebound or guarding.  No CVA tenderness. Nonsurgical abdomen.  Genitourinary: Exam deferred.  Musculoskeletal: Full range of motion.  Pulses full and equal. No cyanosis, clubbing, or edema.  Neurological:  Cranial nerves are grossly intact, grossly normal sensation, no weakness, no focal findings identified.  Psychiatric: Appropriate mood and affect.                Patient History   Past Medical History:   Diagnosis Date    Essential (primary) " hypertension 12/14/2021    Hypertension    Gastro-esophageal reflux disease without esophagitis 11/09/2020    GERD (gastroesophageal reflux disease)    Hyperlipidemia, unspecified 12/14/2021    Hyperlipidemia    Other seasonal allergic rhinitis 03/18/2021    Seasonal allergies    Other specified respiratory disorders 12/14/2021    Reversible airway obstruction     Past Surgical History:   Procedure Laterality Date    OTHER SURGICAL HISTORY  12/14/2021    Colonoscopy    OTHER SURGICAL HISTORY  11/09/2020    Knee replacement     No family history on file.  Social History     Tobacco Use    Smoking status: Never    Smokeless tobacco: Never   Substance Use Topics    Alcohol use: Never    Drug use: Never       Physical Exam   ED Triage Vitals   Temp Pulse Resp BP   -- -- -- --      SpO2 Temp src Heart Rate Source Patient Position   -- -- -- --      BP Location FiO2 (%)     -- --       Physical Exam      ED Course & MDM   Diagnoses as of 03/13/25 0901   New onset atrial fibrillation (Multi)                 No data recorded                                 Medical Decision Making  Labs Reviewed  CBC WITH AUTO DIFFERENTIAL - Abnormal     WBC                           6.2                    nRBC                          0.0                    RBC                           4.77                   Hemoglobin                    13.8                   Hematocrit                    43.4                   MCV                           91                     MCH                           28.9                   MCHC                          31.8 (*)               RDW                           14.8 (*)               Platelets                     281                    Neutrophils %                 62.2                   Immature Granulocytes %, Automated   0.2                    Lymphocytes %                 25.3                   Monocytes %                   8.6                    Eosinophils %                 3.1                     Basophils %                   0.6                    Neutrophils Absolute          3.83                   Immature Granulocytes Absolute, Au*   0.01                   Lymphocytes Absolute          1.56                   Monocytes Absolute            0.53                   Eosinophils Absolute          0.19                   Basophils Absolute            0.04                COMPREHENSIVE METABOLIC PANEL - Abnormal     Glucose                       113 (*)                Sodium                        139                    Potassium                     3.8                    Chloride                      103                    Bicarbonate                   30                     Anion Gap                     10                     Urea Nitrogen                 17                     Creatinine                    0.91                   eGFR                          67                     Calcium                       9.1                    Albumin                       3.9                    Alkaline Phosphatase          58                     Total Protein                 6.8                    AST                           13                     Bilirubin, Total              0.4                    ALT                           12                  URINALYSIS WITH REFLEX CULTURE AND MICROSCOPIC - Abnormal     Color, Urine                  Colorless (*)               Appearance, Urine             Clear                  Specific Gravity, Urine       1.009                  pH, Urine                     7.0                    Protein, Urine                NEGATIVE                Glucose, Urine                Normal                 Blood, Urine                  NEGATIVE                Ketones, Urine                NEGATIVE                Bilirubin, Urine              NEGATIVE                Urobilinogen, Urine           Normal                 Nitrite, Urine                NEGATIVE                Leukocyte Esterase, Urine      NEGATIVE             MAGNESIUM - Normal     Magnesium                     2.21                TROPONIN I, HIGH SENSITIVITY - Normal     Troponin I, High Sensitivity   12                         Narrative: Less than 99th percentile of normal range cutoff-                  Female and children under 18 years old <14 ng/L; Male <21 ng/L: Negative                  Repeat testing should be performed if clinically indicated.                                     Female and children under 18 years old 14-50 ng/L; Male 21-50 ng/L:                  Consistent with possible cardiac damage and possible increased clinical                   risk. Serial measurements may help to assess extent of myocardial damage.                                     >50 ng/L: Consistent with cardiac damage, increased clinical risk and                  myocardial infarction. Serial measurements may help assess extent of                   myocardial damage.                                      NOTE: Children less than 1 year old may have higher baseline troponin                   levels and results should be interpreted in conjunction with the overall                   clinical context.                                     NOTE: Troponin I testing is performed using a different                   testing methodology at [facility] than at other                   [facility]. Direct result comparisons should only                   be made within the same method.  URINALYSIS WITH REFLEX CULTURE AND MICROSCOPIC         Narrative: The following orders were created for panel order Urinalysis with Reflex Culture and Microscopic.                  Procedure                               Abnormality         Status                                     ---------                               -----------         ------                                     Urinalysis with Reflex C...[[id number]]  Abnormal            Final result                                Extra Urine Gray Tube[[id number]]                            In process                                                   Please view results for these tests on the individual orders.  EXTRA URINE GRAY TUBE  XR chest 1 view   Final Result    No radiographic evidence of an acute cardiopulmonary process.          MACRO:    None.          Signed by: Tarun Akosuamaurice 3/13/2025 7:52 AM    Dictation workstation:   [id number]     Medical Decision Making:    Patient appears well and nontoxic.  Initially in atrial fibrillation with RVR.  Lab work unremarkable.  Chest x-ray clear.  Heart rate improved without treatment.  Patient found to be in new onset atrial fibrillation.  Patient will be started on metoprolol and Eliquis.  Given cardiology follow-up.  Asked to return for new or worsening symptoms.  Stable at time of discharge.    Differential Diagnoses Considered: Atrial fibrillation, ACS, pneumonia, pneumothorax, electrolyte abnormality    Independent Interpretation of Studies:  I independently interpreted: Chest x-ray shows no evidence of pneumonia or pneumothorax.    Escalation of Care:  Appropriate for discharge and follow-up with primary care and cardiology.    Prescription Drug Consideration: Oral metoprolol and Eliquis.          Procedure  ECG 12 lead    Performed by: Rojelio Baxter DO  Authorized by: Rojelio Baxter DO    ECG interpreted by ED Physician in the absence of a cardiologist: yes    Comments:      EKG interpreted by Dr. Abel Baxter: Atrial fibrillation with rapid ventricular response at 103 bpm.  QTc of 429 ms.  Nonspecific ST changes.       Rojelio Baxter DO  03/13/25 0902

## 2025-03-14 ENCOUNTER — OFFICE VISIT (OUTPATIENT)
Age: 75
End: 2025-03-14
Payer: MEDICARE

## 2025-03-14 VITALS
DIASTOLIC BLOOD PRESSURE: 80 MMHG | WEIGHT: 263 LBS | HEART RATE: 80 BPM | SYSTOLIC BLOOD PRESSURE: 130 MMHG | HEIGHT: 62 IN | BODY MASS INDEX: 48.4 KG/M2

## 2025-03-14 DIAGNOSIS — Z78.0 ASYMPTOMATIC MENOPAUSAL STATE: ICD-10-CM

## 2025-03-14 DIAGNOSIS — Z13.6 SCREENING FOR HEART DISEASE: ICD-10-CM

## 2025-03-14 DIAGNOSIS — I48.91 ATRIAL FIBRILLATION, UNSPECIFIED TYPE (MULTI): ICD-10-CM

## 2025-03-14 DIAGNOSIS — R73.03 PREDIABETES: ICD-10-CM

## 2025-03-14 DIAGNOSIS — Z12.31 ENCOUNTER FOR SCREENING MAMMOGRAM FOR BREAST CANCER: ICD-10-CM

## 2025-03-14 DIAGNOSIS — Z11.59 NEED FOR HEPATITIS C SCREENING TEST: ICD-10-CM

## 2025-03-14 DIAGNOSIS — Z00.00 ROUTINE GENERAL MEDICAL EXAMINATION AT HEALTH CARE FACILITY: Primary | ICD-10-CM

## 2025-03-14 DIAGNOSIS — I10 PRIMARY HYPERTENSION: ICD-10-CM

## 2025-03-14 DIAGNOSIS — E78.49 OTHER HYPERLIPIDEMIA: ICD-10-CM

## 2025-03-14 DIAGNOSIS — E66.01 MORBID (SEVERE) OBESITY DUE TO EXCESS CALORIES (MULTI): ICD-10-CM

## 2025-03-14 PROCEDURE — G2211 COMPLEX E/M VISIT ADD ON: HCPCS | Performed by: FAMILY MEDICINE

## 2025-03-14 PROCEDURE — 1159F MED LIST DOCD IN RCRD: CPT | Performed by: FAMILY MEDICINE

## 2025-03-14 PROCEDURE — G0439 PPPS, SUBSEQ VISIT: HCPCS | Performed by: FAMILY MEDICINE

## 2025-03-14 PROCEDURE — 1124F ACP DISCUSS-NO DSCNMKR DOCD: CPT | Performed by: FAMILY MEDICINE

## 2025-03-14 PROCEDURE — 3008F BODY MASS INDEX DOCD: CPT | Performed by: FAMILY MEDICINE

## 2025-03-14 PROCEDURE — 1170F FXNL STATUS ASSESSED: CPT | Performed by: FAMILY MEDICINE

## 2025-03-14 PROCEDURE — 3079F DIAST BP 80-89 MM HG: CPT | Performed by: FAMILY MEDICINE

## 2025-03-14 PROCEDURE — 1160F RVW MEDS BY RX/DR IN RCRD: CPT | Performed by: FAMILY MEDICINE

## 2025-03-14 PROCEDURE — 99214 OFFICE O/P EST MOD 30 MIN: CPT | Performed by: FAMILY MEDICINE

## 2025-03-14 PROCEDURE — 1036F TOBACCO NON-USER: CPT | Performed by: FAMILY MEDICINE

## 2025-03-14 PROCEDURE — 3075F SYST BP GE 130 - 139MM HG: CPT | Performed by: FAMILY MEDICINE

## 2025-03-14 ASSESSMENT — ACTIVITIES OF DAILY LIVING (ADL)
DOING_HOUSEWORK: INDEPENDENT
TAKING_MEDICATION: INDEPENDENT
MANAGING_FINANCES: INDEPENDENT
GROCERY_SHOPPING: INDEPENDENT
BATHING: INDEPENDENT
DRESSING: INDEPENDENT

## 2025-03-14 ASSESSMENT — PATIENT HEALTH QUESTIONNAIRE - PHQ9
SUM OF ALL RESPONSES TO PHQ9 QUESTIONS 1 AND 2: 1
10. IF YOU CHECKED OFF ANY PROBLEMS, HOW DIFFICULT HAVE THESE PROBLEMS MADE IT FOR YOU TO DO YOUR WORK, TAKE CARE OF THINGS AT HOME, OR GET ALONG WITH OTHER PEOPLE: NOT DIFFICULT AT ALL
1. LITTLE INTEREST OR PLEASURE IN DOING THINGS: SEVERAL DAYS
2. FEELING DOWN, DEPRESSED OR HOPELESS: NOT AT ALL

## 2025-03-14 NOTE — PROGRESS NOTES
"Subjective   Reason for Visit: Sheila Welsh is an 74 y.o. female here for a Medicare Wellness visit.     Past Medical, Surgical, and Family History reviewed and updated in chart.    Reviewed all medications by prescribing practitioner or clinical pharmacist (such as prescriptions, OTCs, herbal therapies and supplements) and documented in the medical record.    HPI  ER follow up from yesterday.  Had palpitations in the morning, went to ER and had diagnosis of a fib. Started on metoprolol and eliquis.  Feels fine she states.  Has sleep apnea, reports compliance with cpap.  No chest pain.   Has cardiology appt scheduled.   Htn, stable Hyperlipidemia on low dose statin  BMI 48, have previously reviewed benefits of 5 to 10 percent total body weight loss  Prediabetes, due to reassess  Patient Care Team:  Melanie Marti MD as PCP - General (Family Medicine)  Melanie Marti MD as PCP - [employer] PCP     Review of Systems   All other systems reviewed and are negative.      Objective   Vitals:  /80 (BP Location: Left arm, Patient Position: Sitting)   Pulse 80   Ht 1.575 m (5' 2\")   Wt 119 kg (263 lb)   BMI 48.10 kg/m²       Physical Exam  Vitals and nursing note reviewed.   Constitutional:       General: She is not in acute distress.     Appearance: Normal appearance. She is not toxic-appearing.   HENT:      Head: Normocephalic and atraumatic.   Cardiovascular:      Rate and Rhythm: Normal rate. Rhythm irregular.      Heart sounds: No murmur heard.  Pulmonary:      Effort: Pulmonary effort is normal.      Breath sounds: Normal breath sounds.   Musculoskeletal:      Cervical back: Neck supple. No rigidity.      Comments:     Skin:     General: Skin is warm and dry.   Neurological:      General: No focal deficit present.      Mental Status: She is alert and oriented to person, place, and time.   Psychiatric:         Mood and Affect: Mood normal.         Behavior: Behavior normal.         Assessment " & Plan  Atrial fibrillation, unspecified type (Multi)    Orders:    Transthoracic Echo (TTE) Complete; Future    TSH with reflex to Free T4 if abnormal; Future    Lipid Panel; Future    Screening for heart disease    Orders:    Transthoracic Echo (TTE) Complete; Future    Prediabetes    Orders:    Hemoglobin A1C; Future    Primary hypertension    Orders:    Lipid Panel; Future    Routine general medical examination at health care facility         Asymptomatic menopausal state    Orders:    XR DEXA bone density; Future    Encounter for screening mammogram for breast cancer    Orders:    BI mammo bilateral screening tomosynthesis; Future    Need for hepatitis C screening test    Orders:    Hepatitis C Antibody; Future    Morbid (severe) obesity due to excess calories (Multi)         Other hyperlipidemia       Follow up in a few weeks after labs, call concerns. Keep cardiology appt.

## 2025-03-18 DIAGNOSIS — Z00.00 HEALTHCARE MAINTENANCE: ICD-10-CM

## 2025-03-18 LAB
CHOLEST SERPL-MCNC: 140 MG/DL
CHOLEST/HDLC SERPL: 2.3 (CALC)
EST. AVERAGE GLUCOSE BLD GHB EST-MCNC: 120 MG/DL
EST. AVERAGE GLUCOSE BLD GHB EST-SCNC: 6.6 MMOL/L
HBA1C MFR BLD: 5.8 % OF TOTAL HGB
HCV AB SERPL QL IA: NORMAL
HDLC SERPL-MCNC: 60 MG/DL
LDLC SERPL CALC-MCNC: 64 MG/DL (CALC)
NONHDLC SERPL-MCNC: 80 MG/DL (CALC)
TRIGL SERPL-MCNC: 76 MG/DL
TSH SERPL-ACNC: 1.67 MIU/L (ref 0.4–4.5)

## 2025-03-18 RX ORDER — ROSUVASTATIN CALCIUM 5 MG/1
5 TABLET, COATED ORAL DAILY
Qty: 90 TABLET | Refills: 1 | Status: SHIPPED | OUTPATIENT
Start: 2025-03-18

## 2025-03-18 RX ORDER — HYDROCHLOROTHIAZIDE 25 MG/1
25 TABLET ORAL DAILY
Qty: 90 TABLET | Refills: 1 | Status: SHIPPED | OUTPATIENT
Start: 2025-03-18

## 2025-03-21 NOTE — PROGRESS NOTES
[facility]  Cardiology Clinic Visit Note    History of present illness:  This is a 74 y.o. female with a history of hypertension, hypercholesteremia, severe obstructive sleep apnea with CPAP compliance, GERD, prediabetes and obesity who presents to the clinic for new onset atrial fibrillation.    She presented to the [facility] emergency department on 3/13/2025 with complaints of palpitations.  She was getting herself dressed in the morning but after like her heart was beating out of her chest.  She reports of history of this sensation happening randomly and intermittently that resolves with rest.  ECG from emergency department revealed atrial fibrillation with a ventricular rate of 103 bpm. Laboratory workup was unremarkable.  She was discharged with a prescription for metoprolol and Eliquis and referred to cardiology.    PMHx/PSHx: As above  Tobacco Denies, Alcohol Denies, Caffeine use   3 cups of coffee /day, Drug use  Denies  FamHx: Mother had afib. Father passed at age 53 of a heart attack.     Subjective:  She denies chest pain, shortness of breath, palpitations, leg edema, fever, chills, orthopnea, paroxysmal nocturnal dyspnea or syncope or any other symptoms since her her visit to the ER.    Cardiac Testing  Echo (April 2025): Pending  [id number] Score  Age 65-74: 1   Sex Female: 1   CHF History No: 0   HTN Yes: 1   Stroke/TIA/Thromboembolism No: 0   Vascular Dz: CAD/PAD/Aortic Plaque No: 0   DM No: 0   Total Score 3       Assessment and Plan  Paroxysmal atrial fibrillation  -AF Dx History: ECG March 2025, first felt in 2024 ; h/o Cardioversion: No; AAD Use: None; Anticoagulation use: Apixaban 5mg BID (current); h/o Ablation: none; [id number] Score: 3  -Severe obstructive sleep apnea on sleep study January 2024, compliant using her CPAP.  -TSH normal on labs March 2025  -Reports no recurrence in atrial fibrillation since her initial evaluation in the ER on  3/13/2025.  -Continue with Eliquis 5 mg twice a day for primary stroke prevention  -Continue with metoprolol tartrate 25 mg twice a day  -Sinus by ECG in the clinic today  -Low A-fib burden based on the symptoms. If has recurrence of A-fib we can put her on a monitor to assess overall burden pursue rhythm control.   -Will appreciate echo results at her next appointment.    Essential hypertension  -Hydrochlorothiazide 25 mg daily    Hypercholesteremia  -LDL 64, at goal on lipid panel March 2025  -Continue moderate intensity statin therapy.    Return to Care:  3 months    Objective  VITALS  Vitals:    03/24/25 1005   BP: 128/68   Pulse: 60   SpO2: 94%       Weight  Vitals:    03/24/25 1005   Weight: 120 kg (265 lb)       Past Medical History  Past Medical History:   Diagnosis Date    Essential (primary) hypertension 12/14/2021    Hypertension    Gastro-esophageal reflux disease without esophagitis 11/09/2020    GERD (gastroesophageal reflux disease)    Hyperlipidemia, unspecified 12/14/2021    Hyperlipidemia    Other seasonal allergic rhinitis 03/18/2021    Seasonal allergies    Other specified respiratory disorders 12/14/2021    Reversible airway obstruction       Past Surgical History  Past Surgical History:   Procedure Laterality Date    OTHER SURGICAL HISTORY  12/14/2021    Colonoscopy    OTHER SURGICAL HISTORY  11/09/2020    Knee replacement       Medications  Current Outpatient Medications   Medication Instructions    apixaban (ELIQUIS) 5 mg, oral, 2 times daily    calcium carbonate-vitamin D3 600 mg-10 mcg (400 unit) tablet 1 tablet    cetirizine (ZyrTEC) 10 mg tablet Take by mouth.    cyanocobalamin (Vitamin B-12) 100 mcg tablet 1 tablet, Daily    hydroCHLOROthiazide (HYDRODIURIL) 25 mg, oral, Daily    ibuprofen 200 mg tablet Take by mouth.    levalbuterol (Xopenex) 45 mcg/actuation inhaler 1-2 puffs, inhalation, Every 4 hours PRN    metoprolol tartrate (LOPRESSOR) 25 mg, oral, 2 times daily    multivitamin  tablet Take by mouth.    omeprazole (PRILOSEC) 40 mg, oral, Daily    rosuvastatin (CRESTOR) 5 mg, oral, Daily       Allergies  Allergies   Allergen Reactions    Moxifloxacin Nausea/vomiting and Nausea Only     difficulty  breathing     difficulty  breathing    Penicillins Hives     unknown    Sulfa (Sulfonamide Antibiotics) Hives       Social History  Social History     Tobacco Use    Smoking status: Never    Smokeless tobacco: Never   Substance Use Topics    Alcohol use: Never    Drug use: Never       Family History  No family history on file.        PHYSICAL EXAM  Physical Exam  Vitals and nursing note reviewed.   Constitutional:       General: She is not in acute distress.     Appearance: She is obese.   HENT:      Head: Normocephalic and atraumatic.      Mouth/Throat:      Mouth: Mucous membranes are moist.      Pharynx: Oropharynx is clear.   Eyes:      General: No scleral icterus.     Pupils: Pupils are equal, round, and reactive to light.   Cardiovascular:      Rate and Rhythm: Normal rate and regular rhythm.      Pulses: Normal pulses.      Heart sounds: Normal heart sounds, S1 normal and S2 normal. No murmur heard.     No friction rub.   Pulmonary:      Effort: Pulmonary effort is normal.      Breath sounds: Normal breath sounds.   Abdominal:      General: Bowel sounds are normal. There is no distension.      Palpations: Abdomen is soft.      Tenderness: There is no abdominal tenderness.   Musculoskeletal:         General: Normal range of motion.      Cervical back: Normal range of motion and neck supple.      Right lower leg: Edema present.      Left lower leg: Edema present.   Skin:     General: Skin is warm and dry.      Capillary Refill: Capillary refill takes less than 2 seconds.      Findings: No rash.   Neurological:      General: No focal deficit present.      Mental Status: She is alert.   Psychiatric:         Mood and Affect: Mood normal.         Behavior: Behavior normal.         Cardiovascular  Labs  Lab Results   Component Value Date    HGB 13.8 03/13/2025    HGB 13.1 02/02/2024    HGB 13.2 01/23/2023    HGB 13.6 11/29/2021    HGB 13.7 10/23/2020     03/13/2025    WBC 6.2 03/13/2025     03/13/2025    K 3.8 03/13/2025    CREATININE 0.91 03/13/2025    CREATININE 0.80 08/08/2024    CREATININE 0.78 02/02/2024    BUN 17 03/13/2025    CALCIUM 9.1 03/13/2025    TROPHS 12 03/13/2025    LDLF 72 01/23/2023       Echocardiogram       The 10-year ASCVD risk score (Ann-Marie BETANCOURT, et al., 2019) is: 14.7%    Values used to calculate the score:      Age: 74 years      Sex: Female      Is Non- : No      Diabetic: No      Tobacco smoker: No      Systolic Blood Pressure: 130 mmHg      Is BP treated: Yes      HDL Cholesterol: 60 mg/dL      Total Cholesterol: 140 mg/dL  Low Risk: <5%  Borderline Risk: 5%-7.4%  Intermediate Risk: 7.5% - 19.9%  High Risk: >20%    If your symptoms worsen or progress please go directory to your nearest emergency department for evaluation.     Thank you for this interesting clinical case and allowing me to participate in the care of this patient. Please reach me out if you have any questions or if you need any clarifications regarding this patient's care.    **Disclaimer: This note was dictated by speech recognition, and every effort has been made to prevent any error in transcription, however minor errors may be present**  ___________________________________________________  Vin Carter, MSN, CNP, ACNPC, CCRN  Advanced Practice Provider, Nurse Practitioner  Division of Cardiovascular Medicine  [facility]  [facility]

## 2025-03-24 ENCOUNTER — APPOINTMENT (OUTPATIENT)
Dept: CARDIOLOGY | Facility: CLINIC | Age: 75
End: 2025-03-24
Payer: MEDICARE

## 2025-03-24 VITALS
HEART RATE: 60 BPM | SYSTOLIC BLOOD PRESSURE: 128 MMHG | OXYGEN SATURATION: 94 % | WEIGHT: 265 LBS | BODY MASS INDEX: 48.76 KG/M2 | DIASTOLIC BLOOD PRESSURE: 68 MMHG | HEIGHT: 62 IN

## 2025-03-24 DIAGNOSIS — I48.0 PAF (PAROXYSMAL ATRIAL FIBRILLATION) (MULTI): Primary | ICD-10-CM

## 2025-03-24 DIAGNOSIS — I48.91 NEW ONSET ATRIAL FIBRILLATION (MULTI): ICD-10-CM

## 2025-03-24 PROCEDURE — 99204 OFFICE O/P NEW MOD 45 MIN: CPT

## 2025-03-24 PROCEDURE — 93000 ELECTROCARDIOGRAM COMPLETE: CPT

## 2025-03-24 PROCEDURE — 1159F MED LIST DOCD IN RCRD: CPT

## 2025-03-24 PROCEDURE — 3074F SYST BP LT 130 MM HG: CPT

## 2025-03-24 PROCEDURE — 1036F TOBACCO NON-USER: CPT

## 2025-03-24 PROCEDURE — 3078F DIAST BP <80 MM HG: CPT

## 2025-03-24 PROCEDURE — 3008F BODY MASS INDEX DOCD: CPT

## 2025-03-27 ENCOUNTER — HOSPITAL ENCOUNTER (OUTPATIENT)
Dept: RADIOLOGY | Facility: CLINIC | Age: 75
Discharge: HOME | End: 2025-03-27
Payer: MEDICARE

## 2025-03-27 ENCOUNTER — APPOINTMENT (OUTPATIENT)
Dept: RADIOLOGY | Facility: CLINIC | Age: 75
End: 2025-03-27
Payer: MEDICARE

## 2025-03-27 VITALS — WEIGHT: 265 LBS | BODY MASS INDEX: 48.76 KG/M2 | HEIGHT: 62 IN

## 2025-03-27 DIAGNOSIS — Z12.31 ENCOUNTER FOR SCREENING MAMMOGRAM FOR BREAST CANCER: ICD-10-CM

## 2025-03-27 PROCEDURE — 77063 BREAST TOMOSYNTHESIS BI: CPT

## 2025-03-27 PROCEDURE — 77067 SCR MAMMO BI INCL CAD: CPT | Performed by: RADIOLOGY

## 2025-03-27 PROCEDURE — 77063 BREAST TOMOSYNTHESIS BI: CPT | Performed by: RADIOLOGY

## 2025-03-28 ENCOUNTER — HOSPITAL ENCOUNTER (OUTPATIENT)
Dept: RADIOLOGY | Facility: CLINIC | Age: 75
Discharge: HOME | End: 2025-03-28
Payer: MEDICARE

## 2025-03-28 DIAGNOSIS — Z78.0 ASYMPTOMATIC MENOPAUSAL STATE: ICD-10-CM

## 2025-03-28 PROCEDURE — 77080 DXA BONE DENSITY AXIAL: CPT

## 2025-04-02 ENCOUNTER — HOSPITAL ENCOUNTER (OUTPATIENT)
Dept: CARDIOLOGY | Facility: HOSPITAL | Age: 75
Discharge: HOME | End: 2025-04-02
Payer: MEDICARE

## 2025-04-02 DIAGNOSIS — I48.91 ATRIAL FIBRILLATION, UNSPECIFIED TYPE (MULTI): ICD-10-CM

## 2025-04-02 DIAGNOSIS — Z13.6 SCREENING FOR HEART DISEASE: ICD-10-CM

## 2025-04-02 PROCEDURE — 93306 TTE W/DOPPLER COMPLETE: CPT | Performed by: STUDENT IN AN ORGANIZED HEALTH CARE EDUCATION/TRAINING PROGRAM

## 2025-04-02 PROCEDURE — 93306 TTE W/DOPPLER COMPLETE: CPT

## 2025-04-04 LAB
AORTIC VALVE MEAN GRADIENT: 7 MMHG
AORTIC VALVE PEAK VELOCITY: 2.02 M/S
AV PEAK GRADIENT: 16 MMHG
AVA (PEAK VEL): 1.82 CM2
AVA (VTI): 1.98 CM2
EJECTION FRACTION APICAL 4 CHAMBER: 65.4
EJECTION FRACTION: 60 %
LEFT ATRIUM VOLUME AREA LENGTH INDEX BSA: 19.8 ML/M2
LEFT VENTRICLE INTERNAL DIMENSION DIASTOLE: 5.81 CM (ref 3.5–6)
LEFT VENTRICULAR OUTFLOW TRACT DIAMETER: 2.1 CM
LV EJECTION FRACTION BIPLANE: 58 %
MITRAL VALVE E/A RATIO: 1.26
RIGHT VENTRICLE PEAK SYSTOLIC PRESSURE: 39.7 MMHG
TRICUSPID ANNULAR PLANE SYSTOLIC EXCURSION: 2 CM

## 2025-04-14 ENCOUNTER — TELEPHONE (OUTPATIENT)
Age: 75
End: 2025-04-14
Payer: MEDICARE

## 2025-04-14 DIAGNOSIS — I48.91 NEW ONSET ATRIAL FIBRILLATION (MULTI): ICD-10-CM

## 2025-04-14 RX ORDER — METOPROLOL TARTRATE 25 MG/1
25 TABLET, FILM COATED ORAL 2 TIMES DAILY
Qty: 60 TABLET | Refills: 0 | Status: SHIPPED | OUTPATIENT
Start: 2025-04-14 | End: 2025-04-17 | Stop reason: DRUGHIGH

## 2025-04-14 NOTE — TELEPHONE ENCOUNTER
Okay, we can discuss at her appt this week.     You routed conversation to Melanie Marti MD5 hours ago (8:26 AM)     Sheila Welsh [phone]  You5 hours ago (8:25 AM)     You routed conversation to Melanie Matri MD5 hours ago (8:25 AM)     You5 hours ago (8:25 AM)       Patient notified Dr. Marti will discuss fatigue side effects with her at appointment. Patient verbalized understanding      PATIENT CALLED TO REPORT SHE WILL BE OUT OF ELIQUIS, 5 MG BID AND METOPROLOL TARTRATE, 25 MG, BID. SHE SEES YOU 4/17/25. THE MAIN CONCERN SHE HAS IS THE METOPROLOL CAUSED PROFOUND FATIGUE. PLEASE ADVISE. REFILLS PROPOSED.

## 2025-04-14 NOTE — TELEPHONE ENCOUNTER
Patient notified Dr. Marti will discuss fatigue side effects with her at appointment. Patient verbalized understanding     PATIENT CALLED TO REPORT SHE WILL BE OUT OF ELIQUIS, 5 MG BID AND METOPROLOL TARTRATE, 25 MG, BID. SHE SEES YOU 4/17/25. THE MAIN CONCERN SHE HAS IS THE METOPROLOL CAUSED PROFOUND FATIGUE. PLEASE ADVISE. REFILLS PROPOSED.    09-Jul-2019 12:06

## 2025-04-17 ENCOUNTER — APPOINTMENT (OUTPATIENT)
Age: 75
End: 2025-04-17
Payer: MEDICARE

## 2025-04-17 VITALS
HEART RATE: 64 BPM | SYSTOLIC BLOOD PRESSURE: 134 MMHG | HEIGHT: 62 IN | WEIGHT: 267 LBS | DIASTOLIC BLOOD PRESSURE: 78 MMHG | BODY MASS INDEX: 49.13 KG/M2

## 2025-04-17 DIAGNOSIS — G47.30 SEVERE SLEEP APNEA: ICD-10-CM

## 2025-04-17 DIAGNOSIS — I10 PRIMARY HYPERTENSION: ICD-10-CM

## 2025-04-17 DIAGNOSIS — I48.91 NEW ONSET ATRIAL FIBRILLATION (MULTI): ICD-10-CM

## 2025-04-17 DIAGNOSIS — I48.0 PAF (PAROXYSMAL ATRIAL FIBRILLATION) (MULTI): Primary | ICD-10-CM

## 2025-04-17 DIAGNOSIS — R73.03 PREDIABETES: ICD-10-CM

## 2025-04-17 PROCEDURE — 1159F MED LIST DOCD IN RCRD: CPT | Performed by: FAMILY MEDICINE

## 2025-04-17 PROCEDURE — 3075F SYST BP GE 130 - 139MM HG: CPT | Performed by: FAMILY MEDICINE

## 2025-04-17 PROCEDURE — 3008F BODY MASS INDEX DOCD: CPT | Performed by: FAMILY MEDICINE

## 2025-04-17 PROCEDURE — 1160F RVW MEDS BY RX/DR IN RCRD: CPT | Performed by: FAMILY MEDICINE

## 2025-04-17 PROCEDURE — G2211 COMPLEX E/M VISIT ADD ON: HCPCS | Performed by: FAMILY MEDICINE

## 2025-04-17 PROCEDURE — 1036F TOBACCO NON-USER: CPT | Performed by: FAMILY MEDICINE

## 2025-04-17 PROCEDURE — 99214 OFFICE O/P EST MOD 30 MIN: CPT | Performed by: FAMILY MEDICINE

## 2025-04-17 PROCEDURE — 3078F DIAST BP <80 MM HG: CPT | Performed by: FAMILY MEDICINE

## 2025-04-17 RX ORDER — METOPROLOL TARTRATE 25 MG/1
12.5 TABLET, FILM COATED ORAL 2 TIMES DAILY
Status: SHIPPED
Start: 2025-04-17

## 2025-04-17 NOTE — PROGRESS NOTES
Patient presents for periodic surveillance of chronic medical problems.     Subjective  Sheila Welsh is a 74 y.o. female who presents for Follow-up.  HPI  Followup, recent diagnosed with a fib.  Echo okay.  Has been to cardiology and has follow up scheduled.   Since starting metoprolol has had some swelling in her ankles in the morning.  Feels tired since starting.  Discussed the benefits , and we opted to try having her take 25 1/2 tab bid instead of whole tab and see how that goes  HTN-recheck bp okay, will monitor  Hyperlipidemia on statin  Prediabetes, discussed healthy diet with whole natural foods, and natural progression to diabetes without any changes  Sleep apnea on cpap  Review of Systems   All other systems reviewed and are negative.  .    Allergies[1]    Medications Ordered Prior to Encounter[2]    Problem List[3]    Objective     Visit Vitals  /78 Comment: left arm seated forearm regular cuff   Pulse 64      Physical Exam  Vitals and nursing note reviewed.   Constitutional:       General: She is not in acute distress.     Appearance: Normal appearance. She is not toxic-appearing.   HENT:      Head: Normocephalic and atraumatic.   Eyes:      General: No scleral icterus.  Cardiovascular:      Rate and Rhythm: Normal rate and regular rhythm.      Heart sounds: No murmur heard.  Pulmonary:      Effort: Pulmonary effort is normal.      Breath sounds: Normal breath sounds.   Musculoskeletal:      Cervical back: Neck supple. No rigidity.      Comments:     Neurological:      Mental Status: She is alert. Mental status is at baseline.   Psychiatric:         Mood and Affect: Mood normal.       Hospital Outpatient Visit on 04/02/2025   Component Date Value Ref Range Status    LVOT diam 04/02/2025 2.10  cm Final    MV E/A ratio 04/02/2025 1.26   Final    AV pk rizwana 04/02/2025 2.02  m/s Final    AV mn grad 04/02/2025 7  mmHg Final    LV Biplane EF 04/02/2025 58  % Final    Tricuspid annular plane systolic e*  04/02/2025 2.0  cm Final    LA vol index A/L 04/02/2025 19.8  ml/m2 Final    LV EF 04/02/2025 60  % Final    LVIDd 04/02/2025 5.81  cm Final    RVSP 04/02/2025 39.7  mmHg Final    Aortic Valve Area by Continuity of* 04/02/2025 1.82  cm2 Final    Aortic Valve Area by Continuity of* 04/02/2025 1.98  cm2 Final    AV pk grad 04/02/2025 16  mmHg Final    LV A4C EF 04/02/2025 65.4   Final         Assessment/Plan   Problem List Items Addressed This Visit       Hypertension    Prediabetes    Severe sleep apnea    PAF (paroxysmal atrial fibrillation) (Multi) - Primary    Relevant Medications    metoprolol tartrate (Lopressor) 25 mg tablet     Other Visit Diagnoses         New onset atrial fibrillation (Multi)        Relevant Medications    metoprolol tartrate (Lopressor) 25 mg tablet          Reduce metoprolol tartrate 25 bid to 25 1/2 tab bid, followup in about a month.  Call concerns.          Melanie Marti MD          [1]   Allergies  Allergen Reactions    Moxifloxacin Nausea/vomiting and Nausea Only     difficulty  breathing     difficulty  breathing    Penicillins Hives     unknown    Sulfa (Sulfonamide Antibiotics) Hives   [2]   Current Outpatient Medications on File Prior to Visit   Medication Sig Dispense Refill    apixaban (Eliquis) 5 mg tablet Take 1 tablet (5 mg) by mouth 2 times a day. 60 tablet 0    calcium carbonate-vitamin D3 600 mg-10 mcg (400 unit) tablet Take 1 tablet by mouth.      cetirizine (ZyrTEC) 10 mg tablet Take by mouth.      cyanocobalamin (Vitamin B-12) 100 mcg tablet Take 1 tablet (100 mcg) by mouth once daily.      hydroCHLOROthiazide (HYDRODiuril) 25 mg tablet Take 1 tablet by mouth once daily 90 tablet 1    ibuprofen 200 mg tablet Take by mouth.      levalbuterol (Xopenex) 45 mcg/actuation inhaler Inhale 1-2 puffs every 4 hours if needed for shortness of breath. 15 g 0    multivitamin tablet Take by mouth.      omeprazole (PriLOSEC) 40 mg DR capsule Take 1 capsule (40 mg) by mouth once  daily. 90 capsule 3    rosuvastatin (Crestor) 5 mg tablet Take 1 tablet by mouth once daily 90 tablet 1    [DISCONTINUED] metoprolol tartrate (Lopressor) 25 mg tablet Take 1 tablet (25 mg) by mouth 2 times a day. 60 tablet 0    [DISCONTINUED] apixaban (Eliquis) 5 mg tablet Take 1 tablet (5 mg) by mouth 2 times a day. 60 tablet 0    [DISCONTINUED] metoprolol tartrate (Lopressor) 25 mg tablet Take 1 tablet (25 mg) by mouth 2 times a day. 60 tablet 0     No current facility-administered medications on file prior to visit.   [3]   Patient Active Problem List  Diagnosis    Seasonal allergies    Morbid (severe) obesity due to excess calories (Multi)    Lumbosacral spondylosis without myelopathy    Hypertension    Hyperlipidemia    History of knee replacement    GERD (gastroesophageal reflux disease)    Prediabetes    Severe sleep apnea    PAF (paroxysmal atrial fibrillation) (Multi)

## 2025-05-09 ENCOUNTER — APPOINTMENT (OUTPATIENT)
Age: 75
End: 2025-05-09
Payer: MEDICARE

## 2025-05-15 ENCOUNTER — TELEPHONE (OUTPATIENT)
Age: 75
End: 2025-05-15

## 2025-05-15 ENCOUNTER — APPOINTMENT (OUTPATIENT)
Age: 75
End: 2025-05-15
Payer: MEDICARE

## 2025-05-15 VITALS
DIASTOLIC BLOOD PRESSURE: 70 MMHG | HEART RATE: 76 BPM | WEIGHT: 264 LBS | BODY MASS INDEX: 48.58 KG/M2 | HEIGHT: 62 IN | SYSTOLIC BLOOD PRESSURE: 120 MMHG

## 2025-05-15 DIAGNOSIS — I10 PRIMARY HYPERTENSION: ICD-10-CM

## 2025-05-15 DIAGNOSIS — I48.0 PAF (PAROXYSMAL ATRIAL FIBRILLATION) (MULTI): Primary | ICD-10-CM

## 2025-05-15 DIAGNOSIS — R73.03 PREDIABETES: ICD-10-CM

## 2025-05-15 DIAGNOSIS — R53.83 OTHER FATIGUE: ICD-10-CM

## 2025-05-15 DIAGNOSIS — I48.91 NEW ONSET ATRIAL FIBRILLATION (MULTI): ICD-10-CM

## 2025-05-15 PROCEDURE — 1159F MED LIST DOCD IN RCRD: CPT | Performed by: FAMILY MEDICINE

## 2025-05-15 PROCEDURE — 3078F DIAST BP <80 MM HG: CPT | Performed by: FAMILY MEDICINE

## 2025-05-15 PROCEDURE — 99214 OFFICE O/P EST MOD 30 MIN: CPT | Performed by: FAMILY MEDICINE

## 2025-05-15 PROCEDURE — 1160F RVW MEDS BY RX/DR IN RCRD: CPT | Performed by: FAMILY MEDICINE

## 2025-05-15 PROCEDURE — G2211 COMPLEX E/M VISIT ADD ON: HCPCS | Performed by: FAMILY MEDICINE

## 2025-05-15 PROCEDURE — 3074F SYST BP LT 130 MM HG: CPT | Performed by: FAMILY MEDICINE

## 2025-05-15 PROCEDURE — 1036F TOBACCO NON-USER: CPT | Performed by: FAMILY MEDICINE

## 2025-05-15 PROCEDURE — 3008F BODY MASS INDEX DOCD: CPT | Performed by: FAMILY MEDICINE

## 2025-05-15 RX ORDER — APIXABAN 5 MG/1
5 TABLET, FILM COATED ORAL 2 TIMES DAILY
Qty: 60 TABLET | Refills: 11 | Status: SHIPPED | OUTPATIENT
Start: 2025-05-15

## 2025-05-15 RX ORDER — METOPROLOL TARTRATE 25 MG/1
12.5 TABLET, FILM COATED ORAL 2 TIMES DAILY
Qty: 60 TABLET | Refills: 11 | Status: SHIPPED | OUTPATIENT
Start: 2025-05-15

## 2025-05-15 NOTE — PROGRESS NOTES
"     Subjective  Sheila Welsh is a 74 y.o. female who presents for Follow-up.  HPI  Follow up a fib, she was pretty tired on metoprolol 25 bid, so last visit we cut back to 25 1/2 tab bid.  Bp okay, pulse okay with this dose and she does feel better.  States still a little tired in the afternoon but overall pleased with response to dose reduction.   Bmi 48, discussed diet mainly of foods that one could \"grow or raise\" themselves  Review of Systems   All other systems reviewed and are negative.  .    Allergies[1]    Medications Ordered Prior to Encounter[2]    Problem List[3]    Objective     Visit Vitals  /70 (BP Location: Right arm, Patient Position: Sitting)   Pulse 76      Physical Exam  Vitals reviewed.   HENT:      Head: Normocephalic.   Cardiovascular:      Rate and Rhythm: Normal rate.   Pulmonary:      Effort: Pulmonary effort is normal.      Breath sounds: Normal breath sounds.   Skin:     Coloration: Skin is not pale.   Neurological:      General: No focal deficit present.      Mental Status: She is alert. Mental status is at baseline.         Assessment/Plan   Problem List Items Addressed This Visit       Hypertension    Prediabetes    Relevant Orders    Comprehensive Metabolic Panel    Hemoglobin A1C    PAF (paroxysmal atrial fibrillation) (Multi) - Primary     Other Visit Diagnoses         Other fatigue                   Follow up six months, labs prior.  Call concerns.     Melanie Marti MD          [1]   Allergies  Allergen Reactions    Moxifloxacin Nausea/vomiting and Nausea Only     difficulty  breathing     difficulty  breathing    Penicillins Hives     unknown    Sulfa (Sulfonamide Antibiotics) Hives   [2]   Current Outpatient Medications on File Prior to Visit   Medication Sig Dispense Refill    apixaban (Eliquis) 5 mg tablet Take 1 tablet (5 mg) by mouth 2 times a day. 60 tablet 0    calcium carbonate-vitamin D3 600 mg-10 mcg (400 unit) tablet Take 1 tablet by mouth.      cetirizine " (ZyrTEC) 10 mg tablet Take by mouth.      cyanocobalamin (Vitamin B-12) 100 mcg tablet Take 1 tablet (100 mcg) by mouth once daily.      hydroCHLOROthiazide (HYDRODiuril) 25 mg tablet Take 1 tablet by mouth once daily 90 tablet 1    levalbuterol (Xopenex) 45 mcg/actuation inhaler Inhale 1-2 puffs every 4 hours if needed for shortness of breath. 15 g 0    metoprolol tartrate (Lopressor) 25 mg tablet Take 0.5 tablets (12.5 mg) by mouth 2 times a day.      multivitamin tablet Take by mouth.      omeprazole (PriLOSEC) 40 mg DR capsule Take 1 capsule (40 mg) by mouth once daily. 90 capsule 3    rosuvastatin (Crestor) 5 mg tablet Take 1 tablet by mouth once daily 90 tablet 1    [DISCONTINUED] ibuprofen 200 mg tablet Take by mouth. (Patient not taking: Reported on 5/15/2025)       No current facility-administered medications on file prior to visit.   [3]   Patient Active Problem List  Diagnosis    Seasonal allergies    Morbid (severe) obesity due to excess calories (Multi)    Lumbosacral spondylosis without myelopathy    Hypertension    Hyperlipidemia    History of knee replacement    GERD (gastroesophageal reflux disease)    Prediabetes    Severe sleep apnea    PAF (paroxysmal atrial fibrillation) (Multi)

## 2025-06-23 ENCOUNTER — APPOINTMENT (OUTPATIENT)
Dept: CARDIOLOGY | Facility: CLINIC | Age: 75
End: 2025-06-23
Payer: MEDICARE

## 2025-06-23 VITALS
OXYGEN SATURATION: 97 % | WEIGHT: 268.3 LBS | HEART RATE: 57 BPM | HEIGHT: 62 IN | BODY MASS INDEX: 49.37 KG/M2 | SYSTOLIC BLOOD PRESSURE: 130 MMHG | DIASTOLIC BLOOD PRESSURE: 80 MMHG

## 2025-06-23 DIAGNOSIS — I10 PRIMARY HYPERTENSION: ICD-10-CM

## 2025-06-23 DIAGNOSIS — E78.00 PURE HYPERCHOLESTEROLEMIA: ICD-10-CM

## 2025-06-23 DIAGNOSIS — I48.0 PAF (PAROXYSMAL ATRIAL FIBRILLATION) (MULTI): Primary | ICD-10-CM

## 2025-06-23 PROCEDURE — G2211 COMPLEX E/M VISIT ADD ON: HCPCS

## 2025-06-23 PROCEDURE — 99214 OFFICE O/P EST MOD 30 MIN: CPT

## 2025-06-23 PROCEDURE — 3075F SYST BP GE 130 - 139MM HG: CPT

## 2025-06-23 PROCEDURE — 1159F MED LIST DOCD IN RCRD: CPT

## 2025-06-23 PROCEDURE — 1036F TOBACCO NON-USER: CPT

## 2025-06-23 PROCEDURE — 3079F DIAST BP 80-89 MM HG: CPT

## 2025-06-23 PROCEDURE — 3008F BODY MASS INDEX DOCD: CPT

## 2025-06-23 RX ORDER — ACETAMINOPHEN 500 MG
1000 TABLET ORAL EVERY 6 HOURS PRN
COMMUNITY

## 2025-06-23 NOTE — PROGRESS NOTES
[facility]  Cardiology Clinic Visit Note    History of present illness:  This is a pleasant 75 y.o. female with a history of hypertension, hypercholesteremia, severe obstructive sleep apnea with CPAP compliance, GERD, prediabetes and obesity who presents to the clinic 3 month follow up.     PMHx/PSHx: As above  Tobacco Denies, Alcohol Denies, Caffeine use  3 cups of coffee /day, Drug use  Denies  FamHx: Mother had afib. Father passed at age 53 of a heart attack.     She presented to the [facility] emergency department on 3/13/2025 with complaints of palpitations. She was getting herself dressed in the morning but after like her heart was beating out of her chest. She reports of history of this sensation happening randomly and intermittently that resolves with rest. ECG from emergency department revealed atrial fibrillation with a ventricular rate of 103 bpm. Laboratory workup was unremarkable. She was discharged with a prescription for metoprolol and Eliquis and referred to cardiology.     I first saw her in the office on 3/24/2025 as a new referral for new onset atrial fibrillation.  I ordered an echocardiogram to evaluate for structural heart and continued DOAC and rate control.  I deferred ambulatory cardiac monitoring due to no recurrent signs or symptoms of A-fib.     At her last PCP visit Dr. Mrati decreased her metoprolol to 12.5 mg twice a day due to fatigue and edema.    Subjective:  She states she feels fatigued, having to take an hour nap most days midday.  She states that her fatigue was present prior to initiation of metoprolol but it seems to be worse.  Sleep an average of 4 to 6 hours a night.  She denies denies chest pain, shortness of breath, palpitations, leg edema, fever, chills, orthopnea, paroxysmal nocturnal dyspnea or syncope.     Cardiac Testing  Personally reviewed with my independent interpretation:  Echo (April 2025): Normal left ventricular systolic  function with estimated LVEF 60% with no regional wall motion abnormalities.  Moderate eccentric LV hypertrophy with mild increase septal and mildly increased posterior LV wall thickness.  Normal RV size and systolic function.  Normal LA, normal RA.  Mild mitral annular calcification with trace to mild MR.  Mild TR with mildly elevated RVSP at 39.7 mmHg.  Mildly increased aortic valve velocity with no significant gradient.  Trivial pericardial effusion.  [id number] Score  Age 65-74: 1   Sex Female: 1   CHF History No: 0   HTN Yes: 1   Stroke/TIA/Thromboembolism No: 0   Vascular Dz: CAD/PAD/Aortic Plaque No: 0   DM No: 0   Total Score 3       Assessment and Plan  Paroxysmal atrial fibrillation  -AF Dx History: ECG March 2025, first felt in 2024 ; h/o Cardioversion: No; AAD Use: None; Anticoagulation use: Apixaban 5mg BID (current); h/o Ablation: none; [id number] Score: 3  -Severe obstructive sleep apnea on sleep study January 2024, compliant using her CPAP.  -Reports no recurrence in atrial fibrillation since her initial evaluation in the ER on 3/13/2025.  - Nonvalvular based on echo.  Preserved LVEF.  -Denies any adverse bleeding events.  She reports heart financial burden the cost of Eliquis, over 100 hours a month.  I will refer her to the  clinical pharmacy to evaluate for the patient assistance program.  - Due to symptoms of fatigue and having to reduce dose of metoprolol we will proceed with 14-day Holter monitor to assess A-fib burden.  Her symptoms could be secondary to morbid obesity and severe sleep apnea.  -Continue Lopressor 12.5 mg twice a day  -Continue Eliquis 5 mg twice a day.     Essential hypertension  - Appears well-controlled  -Continue hydrochlorothiazide 25 mg daily     Hypercholesteremia  -LDL 64, at goal on lipid panel March 2025  -Continue moderate intensity statin therapy.    Return to Care:  Follow up 6 months, call with Holter results     Objective  VITALS  Vitals:    06/23/25 1128    BP: 130/80   Pulse: 57   SpO2: 97%       Weight  Vitals:    06/23/25 1128   Weight: 122 kg (268 lb 4.8 oz)       Past Medical History  Medical History[1]    Past Surgical History  Surgical History[2]    Medications  Current Outpatient Medications   Medication Instructions    calcium carbonate-vitamin D3 600 mg-10 mcg (400 unit) tablet 1 tablet    cetirizine (ZyrTEC) 10 mg tablet Take by mouth.    cyanocobalamin (Vitamin B-12) 100 mcg tablet 1 tablet, Daily    Eliquis 5 mg, oral, 2 times daily    hydroCHLOROthiazide (HYDRODIURIL) 25 mg, oral, Daily    levalbuterol (Xopenex) 45 mcg/actuation inhaler 1-2 puffs, inhalation, Every 4 hours PRN    metoprolol tartrate (LOPRESSOR) 12.5 mg, oral, 2 times daily    multivitamin tablet Take by mouth.    omeprazole (PRILOSEC) 40 mg, oral, Daily    rosuvastatin (CRESTOR) 5 mg, oral, Daily       Allergies  Allergies[3]    Social History  Social History[4]    Family History  Family History[5]    PHYSICAL EXAM  Physical Exam  Vitals and nursing note reviewed.   Constitutional:       General: She is not in acute distress.     Appearance: She is morbidly obese.   HENT:      Head: Normocephalic and atraumatic.      Mouth/Throat:      Mouth: Mucous membranes are moist.      Pharynx: Oropharynx is clear.   Eyes:      General: No scleral icterus.     Pupils: Pupils are equal, round, and reactive to light.   Cardiovascular:      Rate and Rhythm: Normal rate and regular rhythm.      Pulses: Normal pulses.      Heart sounds: Normal heart sounds, S1 normal and S2 normal. No murmur heard.     No friction rub.   Pulmonary:      Effort: Pulmonary effort is normal.      Breath sounds: Normal breath sounds.   Abdominal:      General: Bowel sounds are normal. There is no distension.      Palpations: Abdomen is soft.      Tenderness: There is no abdominal tenderness.   Musculoskeletal:         General: Normal range of motion.      Cervical back: Normal range of motion and neck supple.      Right  lower leg: No edema.      Left lower leg: No edema.   Skin:     General: Skin is warm and dry.      Capillary Refill: Capillary refill takes less than 2 seconds.      Findings: No rash.   Neurological:      General: No focal deficit present.      Mental Status: She is alert.   Psychiatric:         Mood and Affect: Mood normal.         Behavior: Behavior normal.         Cardiovascular Labs  Lab Results   Component Value Date    HGB 13.8 03/13/2025    HGB 13.1 02/02/2024    HGB 13.2 01/23/2023    HGB 13.6 11/29/2021    HGB 13.7 10/23/2020     03/13/2025    WBC 6.2 03/13/2025     03/13/2025    K 3.8 03/13/2025    CREATININE 0.91 03/13/2025    CREATININE 0.80 08/08/2024    CREATININE 0.78 02/02/2024    BUN 17 03/13/2025    CALCIUM 9.1 03/13/2025    TROPHS 12 03/13/2025    LDLF 72 01/23/2023       Echocardiogram  Results for orders placed during the hospital encounter of 04/02/25    Transthoracic Echo (TTE) [place] [address]  Phone [phone], Fax [phone]    TRANSTHORACIC ECHOCARDIOGRAM REPORT    Patient Name:       RICK SILVA SIDDHARTH Hobson Physician:    [id number] Gagan Ray MD  Study Date:         4/2/2025            Ordering Provider:    [id number] HERBERT PICKARD  MRN/PID:            [id number]            Fellow:  Accession#:         [id number]        Nurse:  Date of Birth/Age:  1950 / 74      Sonographer:          Aide HUTCHINST, RCS  Gender Assigned at  F                   Additional Staff:  Birth:  Height:             157.48 cm           Admit Date:  Weight:             118.84 kg           Admission Status:     Outpatient  BSA / BMI:          2.14 m2 / 47.92     Department Location:  [facility] Lab  kg/m2  Blood Pressure: 176 /80 mmHg    Study Type:    TRANSTHORACIC ECHO (TTE) COMPLETE  Diagnosis/ICD: Unspecified atrial fibrillation-I48.91; Encounter for screening  for  cardiovascular disorders-Z13.6  Indication:    AFib  CPT Codes:     Echo Complete w Full Doppler-78817    Patient History:  Pertinent History: No previous echo.    Study Detail: The following Echo studies were performed: 2D, M-Mode, Doppler and  color flow. A bubble study was not performed. The patient was  awake.      PHYSICIAN INTERPRETATION:  Left Ventricle: The left ventricular systolic function is normal, with a visually estimated ejection fraction of 60%. There is moderate eccentric left ventricular hypertrophy. There are no regional wall motion abnormalities. The left ventricular cavity size is normal. There is mild increased septal and mildly increased posterior left ventricular wall thickness. Spectral Doppler shows a normal pattern of left ventricular diastolic filling.  Left Atrium: The left atrial size is normal. A bubble study using agitated saline was not performed.  Right Ventricle: The right ventricle is normal in size. There is normal right ventricular global systolic function.  Right Atrium: The right atrial size is normal.  Aortic Valve: The aortic valve is trileaflet. There are increased aortic valve velocities due to increased flow/dynamic ejection. The aortic valve dimensionless index is 0.57. There is no evidence of aortic valve regurgitation. The peak instantaneous gradient of the aortic valve is 16 mmHg. The mean gradient of the aortic valve is 7 mmHg.  Mitral Valve: The mitral valve is mildly thickened. There is mild mitral annular calcification. There is trace to mild mitral valve regurgitation.  Tricuspid Valve: The tricuspid valve is structurally normal. There is mild tricuspid regurgitation. The Doppler estimated RVSP is mildly elevated right ventricular systolic pressure at 39.7 mmHg.  Pulmonic Valve: The pulmonic valve is structurally normal. There is no indication of pulmonic valve regurgitation.  Pericardium: Trivial pericardial effusion.  Aorta: The aortic root is  normal.      CONCLUSIONS:  1. The left ventricular systolic function is normal, with a visually estimated ejection fraction of 60%.  2. There is normal right ventricular global systolic function.  3. Mildly elevated right ventricular systolic pressure.    QUANTITATIVE DATA SUMMARY:    2D MEASUREMENTS:             Normal Ranges:  Ao Root d:       2.50 cm     (2.0-3.7cm)  LAs:             3.70 cm     (2.7-4.0cm)  IVSd:            1.10 cm     (0.6-1.1cm)  LVPWd:           1.03 cm     (0.6-1.1cm)  LVIDd:           5.81 cm     (3.9-5.9cm)  LVIDs:           3.27 cm  LV Mass Index:   118.4 g/m2  LVEDV Index:     27.80 ml/m2  LV % FS          43.7 %      LEFT ATRIUM:                  Normal Ranges:  LA Vol A4C:        44.7 ml    (22+/-6mL/m2)  LA Vol A2C:        39.3 ml  LA Vol BP:         42.5 ml  LA Vol Index A4C:  20.9ml/m2  LA Vol Index A2C:  18.3 ml/m2  LA Vol Index BP:   19.8 ml/m2  LA Area A4C:       16.2 cm2  LA Area A2C:       15.4 cm2  LA Major Axis A4C: 5.0 cm  LA Major Axis A2C: 5.1 cm  LA Volume Index:   17.8 ml/m2  LA Vol A4C:        43.8 ml  LA Vol A2C:        38.1 ml  LA Vol Index BSA:  19.1 ml/m2      M-MODE MEASUREMENTS:         Normal Ranges:  AoV Exc:             1.70 cm (1.5-2.5cm)      AORTA MEASUREMENTS:         Normal Ranges:  AoV Exc:            1.70 cm (1.5-2.5cm)      LV SYSTOLIC FUNCTION:  Normal Ranges:  EF-A4C View:    65 % (>=55%)  EF-A2C View:    49 %  EF-Biplane:     58 %  EF-Visual:      60 %  LV EF Reported: 60 %      LV DIASTOLIC FUNCTION:           Normal Ranges:  MV Peak E:             0.98 m/s  (0.7-1.2 m/s)  MV Peak A:             0.78 m/s  (0.42-0.7 m/s)  E/A Ratio:             1.26      (1.0-2.2)  MV e'                  0.111 m/s (>8.0)  MV lateral e'          0.10 m/s  MV medial e'           0.13 m/s  E/e' Ratio:            8.80      (<8.0)      MITRAL VALVE:          Normal Ranges:  MV DT:        187 msec (150-240msec)      MITRAL INSUFFICIENCY:             Normal Ranges:  MR  Vmax:              352.00 cm/s      AORTIC VALVE:                      Normal Ranges:  AoV Vmax:                2.02 m/s  (<=1.7m/s)  AoV Peak P.3 mmHg (<20mmHg)  AoV Mean P.0 mmHg  (1.7-11.5mmHg)  LVOT Max Robby:            1.06 m/s  (<=1.1m/s)  AoV VTI:                 48.70 cm  (18-25cm)  LVOT VTI:                27.90 cm  LVOT Diameter:           2.10 cm   (1.8-2.4cm)  AoV Area, VTI:           1.98 cm2  (2.5-5.5cm2)  AoV Area,Vmax:           1.82 cm2  (2.5-4.5cm2)  AoV Dimensionless Index: 0.57      RIGHT VENTRICLE:  RV Basal 3.26 cm  RV Mid   2.17 cm  RV Major 6.9 cm  TAPSE:   20.4 mm      TRICUSPID VALVE/RVSP:          Normal Ranges:  Peak TR Velocity:     3.03 m/s  RV Syst Pressure:     40 mmHg  (< 30mmHg)      PULMONIC VALVE:          Normal Ranges:  PV Accel Time:  85 msec  (>120ms)  PV Max Robby:     1.3 m/s  (0.6-0.9m/s)  PV Max P.8 mmHg      [id number] Gagan Ray MD  Electronically signed on 2025 at 9:14:44 PM        ** Final **       The 10-year ASCVD risk score (Ann-Marie DK, et al., 2019) is: 14.3%    Values used to calculate the score:      Age: 75 years      Sex: Female      Is Non- : No      Diabetic: No      Tobacco smoker: No      Systolic Blood Pressure: 120 mmHg      Is BP treated: Yes      HDL Cholesterol: 60 mg/dL      Total Cholesterol: 140 mg/dL  Low Risk: <5%  Borderline Risk: 5%-7.4%  Intermediate Risk: 7.5% - 19.9%  High Risk: >20%    If your symptoms worsen or progress please go directory to your nearest emergency department for evaluation.     Thank you for this interesting clinical case and allowing me to participate in the care of this patient. Please reach me out if you have any questions or if you need any clarifications regarding this patient's care.    **Disclaimer: This note was dictated by speech recognition, and every effort has been made to prevent any error in transcription, however minor errors may be  present**  ___________________________________________________  Vin Carter, MSN, CNP, ACNPC, CCRN  Advanced Practice Provider, Nurse Practitioner  Division of Cardiovascular Medicine  [facility]  [facility]         [1]   Past Medical History:  Diagnosis Date    Essential (primary) hypertension 12/14/2021    Hypertension    Gastro-esophageal reflux disease without esophagitis 11/09/2020    GERD (gastroesophageal reflux disease)    Hyperlipidemia, unspecified 12/14/2021    Hyperlipidemia    Other seasonal allergic rhinitis 03/18/2021    Seasonal allergies    Other specified respiratory disorders 12/14/2021    Reversible airway obstruction   [2]   Past Surgical History:  Procedure Laterality Date    OTHER SURGICAL HISTORY  12/14/2021    Colonoscopy    OTHER SURGICAL HISTORY  11/09/2020    Knee replacement   [3]   Allergies  Allergen Reactions    Moxifloxacin Nausea/vomiting and Nausea Only     difficulty  breathing     difficulty  breathing    Penicillins Hives     unknown    Sulfa (Sulfonamide Antibiotics) Hives   [4]   Social History  Tobacco Use    Smoking status: Never    Smokeless tobacco: Never   Substance Use Topics    Alcohol use: Never    Drug use: Never   [5]   Family History  Problem Relation Name Age of Onset    Heart attack Father

## 2025-06-25 NOTE — PROGRESS NOTES
"  Pharmacist Clinic: Cardiology Management    Sheila Welsh is a 75 y.o. female was referred to Clinical Pharmacy Team for Anticoagulation management.     Referring Provider: Vin Carter APRN*    THIS IS A NEW PATIENT APPOINTMENT. PATIENT WILL BE ESTABLISHING CARE WITH CLINICAL PHARMACY.    Appointment was completed by Sheila who was reached at primary number.    Allergies Reviewed? Yes    Allergies[1]    Medical History[2]    Medications Ordered Prior to Encounter[3]      RELEVANT LAB RESULTS:  Lab Results   Component Value Date    BILITOT 0.4 03/13/2025    CALCIUM 9.1 03/13/2025    CO2 30 03/13/2025     03/13/2025    CREATININE 0.91 03/13/2025    GLUCOSE 113 (H) 03/13/2025    ALKPHOS 58 03/13/2025    K 3.8 03/13/2025    PROT 6.8 03/13/2025     03/13/2025    AST 13 03/13/2025    ALT 12 03/13/2025    BUN 17 03/13/2025    ANIONGAP 10 03/13/2025    MG 2.21 03/13/2025    ALBUMIN 3.9 03/13/2025    GFRF 79 01/23/2023     Lab Results   Component Value Date    TRIG 76 03/17/2025    CHOL 140 03/17/2025    LDLCALC 64 03/17/2025    HDL 60 03/17/2025     No results found for: \"BMCBC\", \"CBCDIF\"     PHARMACEUTICAL ASSESSMENT:    MEDICATION RECONCILIATION    Was a medication reconciliation completed at this visit? Yes  Home Pharmacy Reviewed? Yes, describe: [facility]    Added:  - none  Changed:  - none  Removed:  - none    Drug Interactions? No    Medication Documentation Review Audit       Reviewed by Sarah E Markley, LPN (Licensed Nurse) on 06/23/25 at 1138      Medication Order Taking? Sig Documenting Provider Last Dose Status   calcium carbonate-vitamin D3 600 mg-10 mcg (400 unit) tablet [id number] Yes Take 1 tablet by mouth. Historical Provider, MD  Active   cetirizine (ZyrTEC) 10 mg tablet [id number] Yes Take by mouth. Historical Provider, MD  Active   cyanocobalamin (Vitamin B-12) 100 mcg tablet [id number] Yes Take 1 tablet (100 mcg) by mouth once daily.   Patient taking differently: Take 1 tablet (100 mcg) " by mouth once daily as needed.    Historical Provider, MD  Active   Eliquis 5 mg tablet [id number] Yes Take 1 tablet by mouth twice daily Melanie Marti MD  Active   hydroCHLOROthiazide (HYDRODiuril) 25 mg tablet [id number] Yes Take 1 tablet by mouth once daily Melnaie Marti MD  Active   levalbuterol (Xopenex) 45 mcg/actuation inhaler [id number] Yes Inhale 1-2 puffs every 4 hours if needed for shortness of breath. Melanie Marti MD  Active   metoprolol tartrate (Lopressor) 25 mg tablet [id number] Yes Take 0.5 tablets (12.5 mg) by mouth 2 times a day. Melanie Marti MD  Active   multivitamin tablet [id number] Yes Take by mouth. Historical Provider, MD  Active   omeprazole (PriLOSEC) 40 mg DR capsule [id number] Yes Take 1 capsule (40 mg) by mouth once daily.   Patient taking differently: Take 1 capsule (40 mg) by mouth once daily as needed.    Melanie Marti MD  Active   rosuvastatin (Crestor) 5 mg tablet [id number] Yes Take 1 tablet by mouth once daily Melanie Marti MD  Active                    DISEASE MANAGEMENT ASSESSMENT:     ANTICOAGULATION ASSESSMENT    DIAGNOSIS: prevention of nonvalvular atrial fibrilliation stroke and systemic embolism  - Patient is projected to be on anticoagulation indefinitely    The 10-year ASCVD risk score (Ann-Marie BETANCOURT, et al., 2019) is: 16.6%    Values used to calculate the score:      Age: 75 years      Sex: Female      Is Non- : No      Diabetic: No      Tobacco smoker: No      Systolic Blood Pressure: 130 mmHg      Is BP treated: Yes      HDL Cholesterol: 60 mg/dL      Total Cholesterol: 140 mg/dL    ANNETTE VASC SCORING CALCULATOR:   [id number] Stroke Risk Points: 3   Values used to calculate this score:    Points  Metrics       0        Has Congestive Heart Failure: No       1        Has Hypertension: Yes       1        Age: 75       0        Has Diabetes: No       0        Had Stroke: No                 Had TIA: No                 Had Thromboembolism: No       0         Has Vascular Disease: No       1        Clinically Relevant Sex: Female    Farzad MARTINEZ, et al. 2009. © 2010 American College [employer] Chest Physicians     ANNETTE VASC SCORING MANUAL:   - [id number] Score: [3] (only included if diagnosis is atrial fibrillation)   Age: [<65 (0)] [65-74 (+1)] [> 75 (+2)]: 1  Sex: [Male/Female (+1)]: 1  CHF history: [No/Yes(+1)]: 0  Hypertension history: [No/Yes(+1)]: 1  Stroke/TIA/thromboembolism history: [No/Yes(+2)]: 0  Vascular disease history (prior MI, peripheral artery disease, aortic plaque): [No/Yes(+1)]: 0  Diabetes history: [No/Yes(+1)]: 0    CURRENT PHARMACOTHERAPY:   Eliquis 5mg BID  Scr 0.91mg/dl  75 yoa  Weighs 122kg    RELEVANT PAST MEDICAL HISTORY:   Afib, HTN, HLD, prediabetes    Affordability/Accessibility: high cost with name brand medications  Adherence/Organization: reports adherence  Adverse Reactions: bruising easily  Recent Hospitalizations: none reported  Recent Falls/Trauma: none reported  Changes in Tobacco or Alcohol Intake:   Tobacco: does not use  Alcohol: does not use    EDUCATION/COUNSELING:   - Counseled patient on MOA, expectations, duration of therapy, contraindications, administration, and monitoring parameters  - Counseled patient of side effects that are indicative of bleeding such as dark tarry stool, unexplainable bruising, or vomiting up a coffee ground like substance    DISCUSSION/NOTES:   Reports adherence to Eliquis. No abnormal bruising or bleeding reported.  Recently picked up a month supply of Eliquis.    ASSESSMENT:    Assessment/Plan   Problem List Items Addressed This Visit       PAF (paroxysmal atrial fibrillation) (Multi)    No dose adjustments needed to Eliquis at today's visit based on their age, weight, and kidney function.          Relevant Orders    Referral to Clinical Pharmacy      Patient Assistance Program (PAP)    Application for program to be submitted for the following medications: Eliquis    Prescription Insurance:  Yes    Paid Test Claim:  Other: RTS 7/12   [facility] Address:  [place]   Members of Household:  2   Files Taxes:  Yes     Patient will be email financial information to pharmacist directly at [email or web address].    Patient verbally reports monthly or yearly income which is less than 400% federal poverty level    Patient aware this process may take up to 6 weeks.     If approved medication must be filled through [facility] and MEDICATION WILL BE MAILED TO PATIENT.         RECOMMENDATIONS/PLAN:    CONTINUE  Eliquis 5mg BID    Last Appnt with Referring Provider: 6/23/25  Next Appnt with Referring Provider: 12/19/25  Clinical Pharmacist follow up: 7/25/25  Type of Encounter: Virtual    Cleve Gaines, nAuj    Verbal consent to manage patient's drug therapy was obtained from the patient . They were informed they may decline to participate or withdraw from participation in pharmacy services at any time.    Continue all meds under the continuation of care with the referring provider and clinical pharmacy team.            [1]   Allergies  Allergen Reactions    Moxifloxacin Nausea/vomiting and Nausea Only     difficulty  breathing     difficulty  breathing    Penicillins Hives     unknown    Sulfa (Sulfonamide Antibiotics) Hives   [2]   Past Medical History:  Diagnosis Date    Essential (primary) hypertension 12/14/2021    Hypertension    Gastro-esophageal reflux disease without esophagitis 11/09/2020    GERD (gastroesophageal reflux disease)    Hyperlipidemia, unspecified 12/14/2021    Hyperlipidemia    Other seasonal allergic rhinitis 03/18/2021    Seasonal allergies    Other specified respiratory disorders 12/14/2021    Reversible airway obstruction   [3]   Current Outpatient Medications on File Prior to Visit   Medication Sig Dispense Refill    acetaminophen (Tylenol) 500 mg tablet Take 2 tablets (1,000 mg) by mouth every 6 hours if needed for mild pain (1 - 3).      calcium  carbonate-vitamin D3 600 mg-10 mcg (400 unit) tablet Take 1 tablet by mouth.      cetirizine (ZyrTEC) 10 mg tablet Take by mouth.      cyanocobalamin (Vitamin B-12) 100 mcg tablet Take 1 tablet (100 mcg) by mouth once daily.      Eliquis 5 mg tablet Take 1 tablet by mouth twice daily 60 tablet 11    hydroCHLOROthiazide (HYDRODiuril) 25 mg tablet Take 1 tablet by mouth once daily 90 tablet 1    levalbuterol (Xopenex) 45 mcg/actuation inhaler Inhale 1-2 puffs every 4 hours if needed for shortness of breath. 15 g 0    metoprolol tartrate (Lopressor) 25 mg tablet Take 0.5 tablets (12.5 mg) by mouth 2 times a day. 60 tablet 11    multivitamin tablet Take by mouth.      omeprazole (PriLOSEC) 40 mg DR capsule Take 1 capsule (40 mg) by mouth once daily. (Patient taking differently: Take 1 capsule (40 mg) by mouth once daily as needed.) 90 capsule 3    rosuvastatin (Crestor) 5 mg tablet Take 1 tablet by mouth once daily 90 tablet 1     No current facility-administered medications on file prior to visit.

## 2025-06-27 ENCOUNTER — TELEMEDICINE (OUTPATIENT)
Dept: PHARMACY | Facility: HOSPITAL | Age: 75
End: 2025-06-27
Payer: MEDICARE

## 2025-06-27 DIAGNOSIS — I48.0 PAF (PAROXYSMAL ATRIAL FIBRILLATION) (MULTI): ICD-10-CM

## 2025-06-27 NOTE — Clinical Note
Sheila noted her income is under the limit for  PAP. We will apply at this time. No dose adjustments needed. She recently picked up a month supply from [facility].

## 2025-07-07 ENCOUNTER — HOSPITAL ENCOUNTER (OUTPATIENT)
Dept: CARDIOLOGY | Facility: HOSPITAL | Age: 75
Discharge: HOME | End: 2025-07-07
Payer: MEDICARE

## 2025-07-07 DIAGNOSIS — I48.0 PAF (PAROXYSMAL ATRIAL FIBRILLATION) (MULTI): ICD-10-CM

## 2025-07-07 PROCEDURE — 9420000001 HC RT PATIENT EDUCATION 5 MIN

## 2025-07-07 PROCEDURE — 93246 EXT ECG>7D<15D RECORDING: CPT

## 2025-07-14 ENCOUNTER — TELEPHONE (OUTPATIENT)
Dept: PHARMACY | Facility: HOSPITAL | Age: 75
End: 2025-07-14
Payer: MEDICARE

## 2025-07-14 PROCEDURE — RXMED WILLOW AMBULATORY MEDICATION CHARGE

## 2025-07-14 NOTE — TELEPHONE ENCOUNTER
Patient Assistance Program Approval:     We are pleased to inform you that your application for assistance has been approved.     This approval is valid through 7/14/26 as long as the following criteria continue to be satisfied:     Your medication (Eliquis) remains covered under your current insurance plan.   Your prescriber does not discontinue therapy.   You do not seek reimbursement from any other private or government-funded programs for the  medication.    Under this program, the pharmacy will first bill your insurance plan for your indemnified specified medication. The [employer] will then offset your copay balance, so that your out-of pocket expense for your specialty medication will be $0.00.    Cleve Gaines, PharmD

## 2025-07-16 ENCOUNTER — PHARMACY VISIT (OUTPATIENT)
Dept: PHARMACY | Facility: CLINIC | Age: 75
End: 2025-07-16
Payer: MEDICARE

## 2025-07-24 NOTE — PROGRESS NOTES
"  Pharmacist Clinic: Cardiology Management    Sheila Welsh is a 75 y.o. female was referred to Clinical Pharmacy Team for anticoagulation management.     Referring Provider: Vin Carter APRN*    THIS IS A FOLLOW UP PATIENT APPOINTMENT. AT LAST VISIT ON 6/27/25 WITH PHARMACIST (Cleve Gaines).    Appointment was completed by Sheila Welsh who was reached at Primary contact number.    REVIEW OF PAST APPNT (IF APPLICABLE):   Continuing Eliquis 5mg twice daily  PAP approved through 7/14/2026    Allergies Reviewed? No    Allergies[1]    Medical History[2]    Medications Ordered Prior to Encounter[3]      RELEVANT LAB RESULTS:  Lab Results   Component Value Date    BILITOT 0.4 03/13/2025    CALCIUM 9.1 03/13/2025    CO2 30 03/13/2025     03/13/2025    CREATININE 0.91 03/13/2025    GLUCOSE 113 (H) 03/13/2025    ALKPHOS 58 03/13/2025    K 3.8 03/13/2025    PROT 6.8 03/13/2025     03/13/2025    AST 13 03/13/2025    ALT 12 03/13/2025    BUN 17 03/13/2025    ANIONGAP 10 03/13/2025    MG 2.21 03/13/2025    ALBUMIN 3.9 03/13/2025    GFRF 79 01/23/2023     Lab Results   Component Value Date    TRIG 76 03/17/2025    CHOL 140 03/17/2025    LDLCALC 64 03/17/2025    HDL 60 03/17/2025     No results found for: \"BMCBC\", \"CBCDIF\"     PHARMACEUTICAL ASSESSMENT:    MEDICATION RECONCILIATION    Was a medication reconciliation completed at this visit? No  Home Pharmacy Reviewed? Yes, describe: [facility]      Drug Interactions? No    Medication Documentation Review Audit       Reviewed by Sarah E Markley, LPN (Licensed Nurse) on 06/23/25 at 1138      Medication Order Taking? Sig Documenting Provider Last Dose Status   calcium carbonate-vitamin D3 600 mg-10 mcg (400 unit) tablet [id number] Yes Take 1 tablet by mouth. Historical Provider, MD  Active   cetirizine (ZyrTEC) 10 mg tablet [id number] Yes Take by mouth. Historical Provider, MD  Active   cyanocobalamin (Vitamin B-12) 100 mcg tablet [id number] Yes Take 1 tablet (100 " mcg) by mouth once daily.   Patient taking differently: Take 1 tablet (100 mcg) by mouth once daily as needed.    Historical Provider, MD  Active   Eliquis 5 mg tablet [id number] Yes Take 1 tablet by mouth twice daily Melanie Marti MD  Active   hydroCHLOROthiazide (HYDRODiuril) 25 mg tablet [id number] Yes Take 1 tablet by mouth once daily Melanie Marti MD  Active   levalbuterol (Xopenex) 45 mcg/actuation inhaler [id number] Yes Inhale 1-2 puffs every 4 hours if needed for shortness of breath. Melanie Marti MD  Active   metoprolol tartrate (Lopressor) 25 mg tablet [id number] Yes Take 0.5 tablets (12.5 mg) by mouth 2 times a day. Melanie Marti MD  Active   multivitamin tablet [id number] Yes Take by mouth. Historical Provider, MD  Active   omeprazole (PriLOSEC) 40 mg DR capsule [id number] Yes Take 1 capsule (40 mg) by mouth once daily.   Patient taking differently: Take 1 capsule (40 mg) by mouth once daily as needed.    Melanie Marti MD  Active   rosuvastatin (Crestor) 5 mg tablet [id number] Yes Take 1 tablet by mouth once daily Melanie Marti MD  Active                    DISEASE MANAGEMENT ASSESSMENT:     ANTICOAGULATION ASSESSMENT    DIAGNOSIS: prevention of nonvalvular atrial fibrilliation stroke and systemic embolism  - Patient is projected to be on anticoagulation indefinitely     The 10-year ASCVD risk score (Ann-Marie BETANCOURT, et al., 2019) is: 16.6%    Values used to calculate the score:      Age: 75 years      Clincally relevant sex: Female      Is Non- : No      Diabetic: No      Tobacco smoker: No      Systolic Blood Pressure: 130 mmHg      Is BP treated: Yes      HDL Cholesterol: 60 mg/dL      Total Cholesterol: 140 mg/dL    ANNETTE VASC SCORING CALCULATOR:   [id number] Stroke Risk Points: 3   Values used to calculate this score:    Points  Metrics       0        Has Congestive Heart Failure: No       1        Has Hypertension: Yes       1        Age: 75       0        Has Diabetes: No       0         Had Stroke: No                 Had TIA: No                 Had Thromboembolism: No       0        Has Vascular Disease: No       1        Clinically Relevant Sex: Female      CURRENT PHARMACOTHERAPY:    Eliquis 5mg BID  76 yo  122kg  Scr: 0.91    RELEVANT PAST MEDICAL HISTORY:   Afib, HTN, HLD, pre-diabetes     Affordability/Accessibility:  PAP Assistance until 7/14/26  Adherence/Organization: No, just puts bottles on shelf  Adverse Reactions: No  Recent Hospitalizations: No  Recent Falls/Trauma: No  Changes in Tobacco or Alcohol Intake:   Tobacco: No  Alcohol: No    EDUCATION/COUNSELING:   - Counseled patient on MOA, expectations, duration of therapy, contraindications, administration, and monitoring parameters  - Counseled patient of side effects that are indicative of bleeding such as dark tarry stool, unexplainable bruising, or vomiting up a coffee ground like substance      DISCUSSION/NOTES:   Patient reports no bleeding or bruising to Eliquis  Patient reports adherence to Eliquis  Told patient to call if she has any questions or concerns    ASSESSMENT:    Assessment/Plan   Problem List Items Addressed This Visit       PAF (paroxysmal atrial fibrillation) (Multi) - Primary    No dose adjustments needed to Eliquis at today's visit based on their age, weight, and kidney function.          Relevant Orders    Referral to Clinical Pharmacy         RECOMMENDATIONS/PLAN:    Continue  Eliquis 5mg BID    Last Appnt with Referring Provider: 6/23/25  Next Appnt with Referring Provider: 12/19/25  Clinical Pharmacist follow up: 6/1/26 @ 10AM  VAF/Application Expiration: Yes    Date: 7/14/26  Type of Encounter: Virtual    Thank you,  Senthil Jolly, PharmD  Clinical Pharmacy Specialist, Cardiology  [phone]    Verbal consent to manage patient's drug therapy was obtained from the patient . They were informed they may decline to participate or withdraw from participation in pharmacy services at any  time.    Continue all meds under the continuation of care with the referring provider and clinical pharmacy team.            [1]   Allergies  Allergen Reactions    Moxifloxacin Nausea/vomiting and Nausea Only     difficulty  breathing     difficulty  breathing    Penicillins Hives     unknown    Sulfa (Sulfonamide Antibiotics) Hives   [2]   Past Medical History:  Diagnosis Date    Essential (primary) hypertension 12/14/2021    Hypertension    Gastro-esophageal reflux disease without esophagitis 11/09/2020    GERD (gastroesophageal reflux disease)    Hyperlipidemia, unspecified 12/14/2021    Hyperlipidemia    Other seasonal allergic rhinitis 03/18/2021    Seasonal allergies    Other specified respiratory disorders 12/14/2021    Reversible airway obstruction   [3]   Current Outpatient Medications on File Prior to Visit   Medication Sig Dispense Refill    acetaminophen (Tylenol) 500 mg tablet Take 2 tablets (1,000 mg) by mouth every 6 hours if needed for mild pain (1 - 3).      apixaban (Eliquis) 5 mg tablet Take 1 tablet (5 mg) by mouth 2 times a day. 180 tablet 3    calcium carbonate-vitamin D3 600 mg-10 mcg (400 unit) tablet Take 1 tablet by mouth.      cetirizine (ZyrTEC) 10 mg tablet Take by mouth.      cyanocobalamin (Vitamin B-12) 100 mcg tablet Take 1 tablet (100 mcg) by mouth once daily.      hydroCHLOROthiazide (HYDRODiuril) 25 mg tablet Take 1 tablet by mouth once daily 90 tablet 1    levalbuterol (Xopenex) 45 mcg/actuation inhaler Inhale 1-2 puffs every 4 hours if needed for shortness of breath. 15 g 0    metoprolol tartrate (Lopressor) 25 mg tablet Take 0.5 tablets (12.5 mg) by mouth 2 times a day. 60 tablet 11    multivitamin tablet Take by mouth.      omeprazole (PriLOSEC) 40 mg DR capsule Take 1 capsule (40 mg) by mouth once daily. (Patient taking differently: Take 1 capsule (40 mg) by mouth once daily as needed.) 90 capsule 3    rosuvastatin (Crestor) 5 mg tablet Take 1 tablet by mouth once daily 90  tablet 1     No current facility-administered medications on file prior to visit.

## 2025-07-25 ENCOUNTER — APPOINTMENT (OUTPATIENT)
Dept: PHARMACY | Facility: HOSPITAL | Age: 75
End: 2025-07-25
Payer: MEDICARE

## 2025-07-25 DIAGNOSIS — I48.0 PAF (PAROXYSMAL ATRIAL FIBRILLATION) (MULTI): Primary | ICD-10-CM

## 2025-07-25 NOTE — Clinical Note
Walker Banuelos,  No dose adjustments needed to Eliquis at today's visit based on their age, weight, and kidney function. PAP approval until 7/2026

## 2025-11-13 ENCOUNTER — APPOINTMENT (OUTPATIENT)
Age: 75
End: 2025-11-13
Payer: MEDICARE

## 2025-12-19 ENCOUNTER — APPOINTMENT (OUTPATIENT)
Dept: CARDIOLOGY | Facility: CLINIC | Age: 75
End: 2025-12-19
Payer: MEDICARE

## 2026-06-01 ENCOUNTER — APPOINTMENT (OUTPATIENT)
Dept: PHARMACY | Facility: HOSPITAL | Age: 76
End: 2026-06-01
Payer: MEDICARE